# Patient Record
Sex: FEMALE | Race: WHITE | NOT HISPANIC OR LATINO | Employment: FULL TIME | URBAN - METROPOLITAN AREA
[De-identification: names, ages, dates, MRNs, and addresses within clinical notes are randomized per-mention and may not be internally consistent; named-entity substitution may affect disease eponyms.]

---

## 2017-01-13 ENCOUNTER — TELEPHONE (OUTPATIENT)
Dept: OBGYN | Facility: CLINIC | Age: 32
End: 2017-01-13

## 2017-03-06 ENCOUNTER — OFFICE VISIT (OUTPATIENT)
Dept: FAMILY MEDICINE | Facility: CLINIC | Age: 32
End: 2017-03-06
Payer: COMMERCIAL

## 2017-03-06 VITALS
WEIGHT: 123 LBS | HEART RATE: 93 BPM | HEIGHT: 65 IN | DIASTOLIC BLOOD PRESSURE: 70 MMHG | TEMPERATURE: 99 F | OXYGEN SATURATION: 100 % | BODY MASS INDEX: 20.49 KG/M2 | SYSTOLIC BLOOD PRESSURE: 114 MMHG

## 2017-03-06 DIAGNOSIS — F41.9 ANXIETY: ICD-10-CM

## 2017-03-06 DIAGNOSIS — Z32.01 PREGNANCY TEST POSITIVE: Primary | ICD-10-CM

## 2017-03-06 PROBLEM — F12.10 MARIJUANA ABUSE: Status: ACTIVE | Noted: 2017-03-06

## 2017-03-06 LAB — BETA HCG QUAL IFA URINE: POSITIVE

## 2017-03-06 PROCEDURE — 84703 CHORIONIC GONADOTROPIN ASSAY: CPT | Performed by: NURSE PRACTITIONER

## 2017-03-06 PROCEDURE — 99213 OFFICE O/P EST LOW 20 MIN: CPT | Performed by: NURSE PRACTITIONER

## 2017-03-06 RX ORDER — PRENATAL VIT/IRON FUM/FOLIC AC 27MG-0.8MG
1 TABLET ORAL DAILY
Qty: 100 TABLET | Refills: 3 | COMMUNITY
Start: 2017-03-06 | End: 2021-09-21

## 2017-03-06 NOTE — NURSING NOTE
"Chief Complaint   Patient presents with     Confirmation Of Pregnancy       Initial /70 (BP Location: Left arm, Patient Position: Chair, Cuff Size: Adult Regular)  Pulse 93  Temp 99  F (37.2  C)  Ht 5' 5.25\" (1.657 m)  Wt 123 lb (55.8 kg)  LMP 02/03/2017  SpO2 100%  BMI 20.31 kg/m2 Estimated body mass index is 20.31 kg/(m^2) as calculated from the following:    Height as of this encounter: 5' 5.25\" (1.657 m).    Weight as of this encounter: 123 lb (55.8 kg).  Medication Reconciliation: complete     Heidy Alanis MA      "

## 2017-03-06 NOTE — PROGRESS NOTES
"  SUBJECTIVE:                                                    Carole Hernández is a 31 year old female  who presents to clinic today for the following health issues:      Chief Complaint   Patient presents with     Confirmation Of Pregnancy     Took positive pregnancy test last week.  Planned pregnancy. .  Urinating frequently, nauseated, breast tenderness, fatigued. No uterine cramping or vaginal bleeding.    History of anxiety. Has been using occasional Klonopin and smoking marijuana to control her symptoms. Tried Celexa and felt too distant from .        Problem list and histories reviewed & adjusted, as indicated.  Additional history: as documented    Patient Active Problem List   Diagnosis     Anxiety     Grief reaction     CARDIOVASCULAR SCREENING; LDL GOAL LESS THAN 160     Marijuana abuse     Past Surgical History   Procedure Laterality Date     No history of surgery         Social History   Substance Use Topics     Smoking status: Former Smoker     Packs/day: 0.50     Types: Cigarettes     Smokeless tobacco: Never Used      Comment: Also using E-cig     Alcohol use 0.0 oz/week     0 Standard drinks or equivalent per week      Comment: twice per month     Family History   Problem Relation Age of Onset     Adopted: Yes     Unknown/Adopted Mother      Unknown/Adopted Father            Reviewed and updated as needed this visit by clinical staff  Tobacco  Allergies  Meds  Med Hx  Surg Hx  Fam Hx  Soc Hx      Reviewed and updated as needed this visit by Provider         ROS:  Constitutional, breast, gu, psych systems are negative, except as otherwise noted.    OBJECTIVE:                                                    /70 (BP Location: Left arm, Patient Position: Chair, Cuff Size: Adult Regular)  Pulse 93  Temp 99  F (37.2  C)  Ht 5' 5.25\" (1.657 m)  Wt 123 lb (55.8 kg)  LMP 2017  SpO2 100%  BMI 20.31 kg/m2  Body mass index is 20.31 kg/(m^2).  GENERAL: healthy, alert and " no distress  PSYCH: mentation appears normal, affect normal/bright    Diagnostic Test Results:  Results for orders placed or performed in visit on 03/06/17 (from the past 24 hour(s))   Beta HCG qual IFA urine   Result Value Ref Range    Beta HCG Qual IFA Urine Positive (A) NEG        ASSESSMENT/PLAN:                                                        1. Pregnancy test positive  Reviewed healthy pregnancy lifestyle: No smoking or alcohol, start daily prenatal vitamin, avoid raw/undercooked meats and soft cheeses, limit fish to 2 servings per week and avoid tuna or seabass which are high in mercury, limit caffeine to 2 cups of coffee per day, don't change litterbox.   Reviewed warning signs of miscarriage including cramping and vaginal bleeding.     - Beta HCG qual IFA urine    2. Anxiety  I have asked her to stop the Klonopin and marijuana. Discussed starting an SSRI for anxiety but she prefers to try to manage without medications and will follow up if her anxiety worsens.      Follow up with Dr. Marcial at 10 weeks gestation    LEILA Moeller [facility]

## 2017-03-06 NOTE — MR AVS SNAPSHOT
After Visit Summary   3/6/2017    Carole Hernández    MRN: [id number]           Patient Information     Date Of Birth          1985        Visit Information        Provider Department      3/6/2017 8:40 AM Mima Kapadia APRN [facility]        Today's Diagnoses     Pregnancy test positive    -  1      Care Instructions    Due date is November 9, 2017  You are 4 weeks and 3 days    Schedule your first appiontment with Dr. Marcial at around 10 weeks gestation    [facility]    If you have any questions regarding to your visit please contact your care team:       Team Red:   Clinic Hours Telephone Number   Dr. Bethany Kapadia, NP   7am-7pm  Monday - Thursday   7am-5pm  Fridays  [phone]  (Appointment scheduling available 24/7)    Questions about your visit?   Team Line  [phone]   [facility] [place] [facility] 11am-9pm Monday-Friday Saturday-Sunday- 9am-5pm   [place] - 5pm-9pm Monday-Friday Saturday-Sunday- 9am-5pm  [phone] - [facility]  [phone] - [place]       What options do I have for visits at the clinic other than the traditional office visit?  To expand how we care for you, many of our providers are utilizing electronic visits (e-visits) and telephone visits, when medically appropriate, for interactions with their patients rather than a visit in the clinic.   We also offer nurse visits for many medical concerns. Just like any other service, we will bill your insurance company for this type of visit based on time spent on the phone with your provider. Not all insurance companies cover these visits. Please check with your medical insurance if this type of visit is covered. You will be responsible for any charges that are not paid by your insurance.      E-visits via [employer]:  generally incur a $35.00 fee.  Telephone visits:  Time spent on the phone: *charged  based on time that is spent on the phone in increments of 10 minutes. Estimated cost:   5-10 mins $30.00   11-20 mins. $59.00   21-30 mins. $85.00     Use Daylifehart (secure email communication and access to your chart) to send your primary care provider a message or make an appointment. Ask someone on your Team how to sign up for Daylifehart.  For a Price Quote for your services, please call our [employer] Line at [phone].      As always, Thank you for trusting us with your health care needs!    Discharged by Heidy Alanis MA.          Follow-ups after your visit        Who to contact     If you have questions or need follow up information about today's clinic visit or your schedule please contact [facility] directly at [phone].  Normal or non-critical lab and imaging results will be communicated to you by MyChart, letter or phone within 4 business days after the clinic has received the results. If you do not hear from us within 7 days, please contact the clinic through Daylifehart or phone. If you have a critical or abnormal lab result, we will notify you by phone as soon as possible.  Submit refill requests through [employer] or call your pharmacy and they will forward the refill request to us. Please allow 3 business days for your refill to be completed.          Additional Information About Your Visit        Daylifehart Information     ShareTrack[employer] gives you secure access to your electronic health record. If you see a primary care provider, you can also send messages to your care team and make appointments. If you have questions, please call your primary care clinic.  If you do not have a primary care provider, please call [phone] and they will assist you.        Care EveryWhere ID     This is your Care EveryWhere ID. This could be used by other organizations to access your [facility] medical records  [id number]        Your Vitals Were     Pulse Temperature Height Last Period Pulse Oximetry BMI (Body  "Mass Index)    93 99  F (37.2  C) 5' 5.25\" (1.657 m) 02/03/2017 100% 20.31 kg/m2       Blood Pressure from Last 3 Encounters:   03/06/17 114/70   09/20/16 110/63   04/11/16 119/76    Weight from Last 3 Encounters:   03/06/17 123 lb (55.8 kg)   09/20/16 115 lb 12.8 oz (52.5 kg)   04/11/16 116 lb (52.6 kg)              We Performed the Following     Beta HCG qual IFA urine        Primary Care Provider Office Phone # Fax #    Mima Ellie Kapadia, LEILA [facility] [phone]       [facility] [address] [facility] [address]        Thank you!     Thank you for choosing [facility]  for your care. Our goal is always to provide you with excellent care. Hearing back from our patients is one way we can continue to improve our services. Please take a few minutes to complete the written survey that you may receive in the mail after your visit with us. Thank you!             Your Updated Medication List - Protect others around you: Learn how to safely use, store and throw away your medicines at www.disposemymeds.org.          This list is accurate as of: 3/6/17  9:36 AM.  Always use your most recent med list.                   Brand Name Dispense Instructions for use    prenatal multivitamin  plus iron 27-0.8 MG Tabs per tablet     100 tablet    Take 1 tablet by mouth daily         "

## 2017-03-06 NOTE — PATIENT INSTRUCTIONS
Due date is November 9, 2017  You are 4 weeks and 3 days    Schedule your first appiontment with Dr. Marcial at around 10 weeks gestation    [facility]    If you have any questions regarding to your visit please contact your care team:       Team Red:   Clinic Hours Telephone Number   Dr. Bethany Kapadia, NP   7am-7pm  Monday - Thursday   7am-5pm  Fridays  [phone]  (Appointment scheduling available 24/7)    Questions about your visit?   Team Line  [phone]   Urgent Care - [place] and [place] - 11am-9pm Monday-Friday Saturday-Sunday- 9am-5pm   [place] - 5pm-9pm Monday-Friday Saturday-Sunday- 9am-5pm  [phone] - [facility]  [phone] - [place]       What options do I have for visits at the clinic other than the traditional office visit?  To expand how we care for you, many of our providers are utilizing electronic visits (e-visits) and telephone visits, when medically appropriate, for interactions with their patients rather than a visit in the clinic.   We also offer nurse visits for many medical concerns. Just like any other service, we will bill your insurance company for this type of visit based on time spent on the phone with your provider. Not all insurance companies cover these visits. Please check with your medical insurance if this type of visit is covered. You will be responsible for any charges that are not paid by your insurance.      E-visits via [employer]imi:  generally incur a $35.00 fee.  Telephone visits:  Time spent on the phone: *charged based on time that is spent on the phone in increments of 10 minutes. Estimated cost:   5-10 mins $30.00   11-20 mins. $59.00   21-30 mins. $85.00     Use [employer] (secure email communication and access to your chart) to send your primary care provider a message or make an appointment. Ask someone on your Team how to sign up for [employer] Mimi.  For a Price Quote for your  services, please call our Consumer Price Line at [phone].      As always, Thank you for trusting us with your health care needs!    Discharged by Heidy Alanis MA.

## 2017-04-10 ENCOUNTER — PRENATAL OFFICE VISIT (OUTPATIENT)
Dept: OBGYN | Facility: CLINIC | Age: 32
End: 2017-04-10
Payer: COMMERCIAL

## 2017-04-10 VITALS
BODY MASS INDEX: 20.56 KG/M2 | DIASTOLIC BLOOD PRESSURE: 66 MMHG | HEIGHT: 65 IN | HEART RATE: 101 BPM | SYSTOLIC BLOOD PRESSURE: 113 MMHG | WEIGHT: 123.4 LBS | OXYGEN SATURATION: 100 %

## 2017-04-10 DIAGNOSIS — O09.291 PREGNANCY WITH HISTORY OF MISCARRIAGE, FIRST TRIMESTER: Primary | ICD-10-CM

## 2017-04-10 PROBLEM — O09.299 PREGNANCY WITH HISTORY OF MISCARRIAGE: Status: ACTIVE | Noted: 2017-04-10

## 2017-04-10 LAB
BASOPHILS # BLD AUTO: 0 10E9/L (ref 0–0.2)
BASOPHILS NFR BLD AUTO: 0.4 %
DIFFERENTIAL METHOD BLD: ABNORMAL
EOSINOPHIL # BLD AUTO: 0.1 10E9/L (ref 0–0.7)
EOSINOPHIL NFR BLD AUTO: 2.1 %
ERYTHROCYTE [DISTWIDTH] IN BLOOD BY AUTOMATED COUNT: 12.8 % (ref 10–15)
HCT VFR BLD AUTO: 33.7 % (ref 35–47)
HGB BLD-MCNC: 11.5 G/DL (ref 11.7–15.7)
LYMPHOCYTES # BLD AUTO: 1.2 10E9/L (ref 0.8–5.3)
LYMPHOCYTES NFR BLD AUTO: 20.6 %
MCH RBC QN AUTO: 31.5 PG (ref 26.5–33)
MCHC RBC AUTO-ENTMCNC: 34.1 G/DL (ref 31.5–36.5)
MCV RBC AUTO: 92 FL (ref 78–100)
MONOCYTES # BLD AUTO: 0.3 10E9/L (ref 0–1.3)
MONOCYTES NFR BLD AUTO: 5.9 %
NEUTROPHILS # BLD AUTO: 4 10E9/L (ref 1.6–8.3)
NEUTROPHILS NFR BLD AUTO: 71 %
PLATELET # BLD AUTO: 241 10E9/L (ref 150–450)
RBC # BLD AUTO: 3.65 10E12/L (ref 3.8–5.2)
WBC # BLD AUTO: 5.6 10E9/L (ref 4–11)

## 2017-04-10 PROCEDURE — 86900 BLOOD TYPING SEROLOGIC ABO: CPT | Performed by: OBSTETRICS & GYNECOLOGY

## 2017-04-10 PROCEDURE — 87340 HEPATITIS B SURFACE AG IA: CPT | Performed by: OBSTETRICS & GYNECOLOGY

## 2017-04-10 PROCEDURE — 86850 RBC ANTIBODY SCREEN: CPT | Performed by: OBSTETRICS & GYNECOLOGY

## 2017-04-10 PROCEDURE — 87086 URINE CULTURE/COLONY COUNT: CPT | Performed by: OBSTETRICS & GYNECOLOGY

## 2017-04-10 PROCEDURE — 36415 COLL VENOUS BLD VENIPUNCTURE: CPT | Performed by: OBSTETRICS & GYNECOLOGY

## 2017-04-10 PROCEDURE — 85025 COMPLETE CBC W/AUTO DIFF WBC: CPT | Performed by: OBSTETRICS & GYNECOLOGY

## 2017-04-10 PROCEDURE — 87389 HIV-1 AG W/HIV-1&-2 AB AG IA: CPT | Performed by: OBSTETRICS & GYNECOLOGY

## 2017-04-10 PROCEDURE — 86901 BLOOD TYPING SEROLOGIC RH(D): CPT | Performed by: OBSTETRICS & GYNECOLOGY

## 2017-04-10 PROCEDURE — 86762 RUBELLA ANTIBODY: CPT | Performed by: OBSTETRICS & GYNECOLOGY

## 2017-04-10 PROCEDURE — 99207 ZZC FIRST OB VISIT: CPT | Performed by: OBSTETRICS & GYNECOLOGY

## 2017-04-10 PROCEDURE — 86780 TREPONEMA PALLIDUM: CPT | Performed by: OBSTETRICS & GYNECOLOGY

## 2017-04-10 NOTE — PROGRESS NOTES
INITIAL OB ASSESSMENT............................................Date: 4/10/2017                            ---------------------    Name: Carole Hernández.......................................................................Plan Number: [id number]    Age: 31 year old   : 1985  Phone: [phone] (home)   Address: [address] [place] [address]    Marital Status:      Occupation:   for [employer] in [address]   Partner's Name:  Andrew Hernández  Partner's Occupation:  Construction, [employer]    OB Physician: Wicho Marcial       LMP:  Patient's LMP from OB Dating Form:  Patient's last menstrual period was 2017.  Regular menses? yes  Menses every month.   Length of menses: 5 days    Obstetrical History  ===================  Obstetric History       T0      TAB0   SAB0   E0   M0   L0       # Outcome Date GA Lbr [place]/2nd Weight Sex Delivery Anes PTL Lv   1 Current                   Past Medical History:  Past Medical History:   Diagnosis Date     BV (bacterial vaginosis)        Past Surgical History:  Past Surgical History:   Procedure Laterality Date     NO HISTORY OF SURGERY         Current Outpatient Prescriptions   Medication Sig Dispense Refill     Prenatal Vit-Fe Fumarate-FA (PRENATAL MULTIVITAMIN  PLUS IRON) 27-0.8 MG TABS per tablet Take 1 tablet by mouth daily 100 tablet 3       No Known Allergies    Social History     Social History     Marital status: Significant other     Spouse name: N/A     Number of children: 0     Years of education: N/A     Occupational History      [employer]     Social History Main Topics     Smoking status: Former Smoker     Packs/day: 0.50     Types: Cigarettes     Smokeless tobacco: Never Used      Comment: Also using E-cig     Alcohol use 0.0 oz/week     0 Standard drinks or equivalent per week      Comment: twice per month     Drug use: No     Sexual activity: Yes     Partners: Male      "Birth control/ protection:      Other Topics Concern     Not on file     Social History Narrative       No substance abuse, environmental exposures, mental health risks and No financial concerns.    The couple has 2 dogs, pit bulls.    Good Partner support.       Family History   Problem Relation Age of Onset     Adopted: Yes     Unknown/Adopted Mother      Unknown/Adopted Father        Past Medical History of Father of Baby:   No significant medical history     She is adopted, so she does not have information regarding family genetics or diseases.   No known genetic diseases in the FOB's 1st or 2nd degree relatives      REVIEW OF SYMPTOMS:   History Since Last Menstrual Period:    nausea, vomiting, fatigue and breast tenderness       PHYSICAL EXAM:  /66 (BP Location: Right arm, Cuff Size: Adult Regular)  Pulse 101  Ht 5' 5.25\" (1.657 m)  Wt 123 lb 6.4 oz (56 kg)  LMP 2017  SpO2 100%  BMI 20.38 kg/m2  General:  WNWD female, NAD  Oriented:  X 3  Alert  PSYCH:  mentation appears normal., affect and mood normal  HEENT:  NC/AT, EOMI  NECK:  Neck supple. No adenopathy. Thyroid symmetric, normal size,, Carotids without bruits.  HEART:  RRR  LUNGS:  Clear to auscultation.  Good respiratory effort  BREASTS: not performed since recent exam   ABDOMEN: Benign, Soft, flat, non-tender, No masses, organomegaly and Bowel sounds normoactive FHM seen on bedside ultrasound.    PELVIC EXAM:  Deferred since recent exam  EXTREMITIES:No cyanosis, clubbing, warm and well perfused and No edema   GAIT: normal including tandem walk, heel and toe walk.        Assessment:   IUP at 9.3 weeks     Plan:  Options for  testing for chromosomal and birth defects were discussed with the patient.  Diagnostic tests include CVS and Amniocentesis.  We discussed that these tests are definitive but invasive and do carry a risk of fetal loss.    Screening tests include nuchal translucency/blood marker testing in the first " trimester and quad screening in the second trimester.  We discussed that these are screening tests and not diagnostic tests and that false positives and negatives are a distinct possibility.  The patient and her  will discuss and decide.  We discussed physician coverage, tertiary support, diet, exercise, weight gain, schedule of visits, routine and indicated ultrasounds, and childbirth education.   Labs done today  RTC 4 weeks

## 2017-04-10 NOTE — PATIENT INSTRUCTIONS
If you have any questions regarding your visit, Please contact your care team.   Women s Health  CLINIC HOURS  TELEPHONE NUMBER    SOREN Solomon-   Jaz Pablo-KULDEEP Long-Medical Assistant  Tuesday-[place]  7:30 a.m-3:30 p.m   Wednesday-[place]  8:00 a.m-4:30 p.m.   Thursday-[place] 8:00 a.m-4:30 p.m.   Friday-[place]  7:30a.m-1:00 p.m.  [facility]   [address] [place] [address].   [place] [address]   [phone] ask for [facility]   Imaging [phone]     [facility]   [address] [place] [address]   [phone]     [facility]  [address] [place] [address]   [phone] ask for [facility]   Imaging [phone]      Urgent Care locations:   [facility]  Monday-Friday   5 pm - 9 pm   Saturday and Sunday   9 am - 5 pm   Monday-Friday   11 am - 9 pm   Saturday and Sunday   9 am - 5 pm  [phone]    If you need a medication refill, please contact your pharmacy. Please allow 3 business days for your refill to be completed.  As always, Thank you for trusting us with your healthcare needs!

## 2017-04-10 NOTE — NURSING NOTE
"Chief Complaint   Patient presents with     Prenatal Care     First OB       Initial /66 (BP Location: Right arm, Cuff Size: Adult Regular)  Pulse 101  Ht 5' 5.25\" (1.657 m)  Wt 123 lb 6.4 oz (56 kg)  LMP 02/03/2017  SpO2 100%  BMI 20.38 kg/m2 Estimated body mass index is 20.38 kg/(m^2) as calculated from the following:    Height as of this encounter: 5' 5.25\" (1.657 m).    Weight as of this encounter: 123 lb 6.4 oz (56 kg).  Medication Reconciliation: complete   MIHAI Le 4/10/2017         "

## 2017-04-10 NOTE — MR AVS SNAPSHOT
After Visit Summary   4/10/2017    Carole Hernández    MRN: [id number]           Patient Information     Date Of Birth          1985        Visit Information        Provider Department      4/10/2017 11:00 AM Wicho Marcial MD [facility]        Today's Diagnoses     Pregnancy with history of miscarriage, first trimester    -  1      Care Instructions    If you have any questions regarding your visit, Please contact your care team.   Women s Health  CLINIC HOURS  TELEPHONE NUMBER    SOREN Solomon-   Jaz Pablo-KULDEEP Long-Medical Assistant  Tuesday-[place]  7:30 a.m-3:30 p.m   Wednesday-[place]  8:00 a.m-4:30 p.m.   Thursday-[place] 8:00 a.m-4:30 p.m.   Friday-[place]  7:30a.m-1:00 p.m.  [facility]   [address] [place] [address]   [phone] ask for [facility]   Imaging [phone]     [facility]   [address] [place] [address] [place] [address]   [phone]     [facility]  [address] [place] [address]    [place] [address]   [phone] ask for [facility]   Imaging [phone]      Urgent Care locations:   [facility]  Monday-Friday   5 pm - 9 pm   Saturday and Sunday   9 am - 5 pm   Monday-Friday   11 am - 9 pm   Saturday and Sunday   9 am - 5 pm  [phone]    If you need a medication refill, please contact your pharmacy. Please allow 3 business days for your refill to be completed.  As always, Thank you for trusting us with your healthcare needs!            Follow-ups after your visit        Your next 10 appointments already scheduled     May 10, 2017 10:00 AM CDT   ESTABLISHED PRENATAL with Wicho Marcial MD   [facility] ([address] [address] [phone]              Who to contact     If you have questions or need follow up  "information about today's clinic visit or your schedule please contact [facility] directly at [phone].  Normal or non-critical lab and imaging results will be communicated to you by MyChart, letter or phone within 4 business days after the clinic has received the results. If you do not hear from us within 7 days, please contact the clinic through Enefgyhart or phone. If you have a critical or abnormal lab result, we will notify you by phone as soon as possible.  Submit refill requests through [employer] or call your pharmacy and they will forward the refill request to us. Please allow 3 business days for your refill to be completed.          Additional Information About Your Visit        Enefgyhar[employer]aHEALTH Information     [employer] gives you secure access to your electronic health record. If you see a primary care provider, you can also send messages to your care team and make appointments. If you have questions, please call your primary care clinic.  If you do not have a primary care provider, please call [phone] and they will assist you.        Care EveryWhere ID     This is your Care EveryWhere ID. This could be used by other organizations to access your [facility] medical records  [id number]        Your Vitals Were     Pulse Height Last Period Pulse Oximetry BMI (Body Mass Index)       101 5' 5.25\" (1.657 m) 02/03/2017 100% 20.38 kg/m2        Blood Pressure from Last 3 Encounters:   04/10/17 113/66   03/06/17 114/70   09/20/16 110/63    Weight from Last 3 Encounters:   04/10/17 123 lb 6.4 oz (56 kg)   03/06/17 123 lb (55.8 kg)   09/20/16 115 lb 12.8 oz (52.5 kg)              We Performed the Following     ABO/Rh type and screen     Anti Treponema     CBC with platelets differential     Hepatitis B surface antigen     HIV Antigen Antibody Combo     Rubella Antibody IgG Quantitative     Urine Culture Aerobic Bacterial        Primary Care Provider Office Phone # Fax #    LEILA Moeller CNP " [phone]       [facility] [address] [facility]  [place] [address]        Thank you!     Thank you for choosing [facility]  for your care. Our goal is always to provide you with excellent care. Hearing back from our patients is one way we can continue to improve our services. Please take a few minutes to complete the written survey that you may receive in the mail after your visit with us. Thank you!             Your Updated Medication List - Protect others around you: Learn how to safely use, store and throw away your medicines at www.disposemymeds.org.          This list is accurate as of: 4/10/17 12:12 PM.  Always use your most recent med list.                   Brand Name Dispense Instructions for use    prenatal multivitamin  plus iron 27-0.8 MG Tabs per tablet     100 tablet    Take 1 tablet by mouth daily

## 2017-04-11 LAB
ABO + RH BLD: NORMAL
ABO + RH BLD: NORMAL
BACTERIA SPEC CULT: NO GROWTH
BLD GP AB SCN SERPL QL: NORMAL
BLOOD BANK CMNT PATIENT-IMP: NORMAL
HBV SURFACE AG SERPL QL IA: NONREACTIVE
HIV 1+2 AB+HIV1 P24 AG SERPL QL IA: NORMAL
MICRO REPORT STATUS: NORMAL
SPECIMEN EXP DATE BLD: NORMAL
SPECIMEN SOURCE: NORMAL
T PALLIDUM IGG+IGM SER QL: NEGATIVE

## 2017-04-13 LAB — RUBV IGG SERPL IA-ACNC: 51 IU/ML

## 2017-05-10 ENCOUNTER — TELEPHONE (OUTPATIENT)
Dept: OBGYN | Facility: CLINIC | Age: 32
End: 2017-05-10

## 2017-05-10 ENCOUNTER — PRENATAL OFFICE VISIT (OUTPATIENT)
Dept: OBGYN | Facility: CLINIC | Age: 32
End: 2017-05-10
Payer: COMMERCIAL

## 2017-05-10 VITALS
WEIGHT: 126.2 LBS | HEART RATE: 98 BPM | SYSTOLIC BLOOD PRESSURE: 113 MMHG | DIASTOLIC BLOOD PRESSURE: 73 MMHG | OXYGEN SATURATION: 98 % | BODY MASS INDEX: 20.84 KG/M2

## 2017-05-10 DIAGNOSIS — O09.292 PREGNANCY WITH HISTORY OF MISCARRIAGE, SECOND TRIMESTER: Primary | ICD-10-CM

## 2017-05-10 PROCEDURE — 99207 ZZC PRENATAL VISIT: CPT | Performed by: OBSTETRICS & GYNECOLOGY

## 2017-05-10 NOTE — LETTER
[facility]  [address] [place] [address]  [phone]      May 10, 2017      Carole MENDOZA Betty  [address]  [facility] [address]          To Whom it may concern,    Carole is under my care for her pregnancy. It is ok for her to have local anesthesia for dental work.          Sincerely,  Wicho Marcial MD

## 2017-05-10 NOTE — TELEPHONE ENCOUNTER
Patient called back. She will pick it up at our [facility] desk tomorrow.  MIHAI Le 5/10/2017

## 2017-05-10 NOTE — NURSING NOTE
"Chief Complaint   Patient presents with     Prenatal Care     13.5 weeks       Initial /73 (BP Location: Right arm, Cuff Size: Adult Regular)  Pulse 98  Wt 126 lb 3.2 oz (57.2 kg)  LMP 02/03/2017  SpO2 98%  BMI 20.84 kg/m2 Estimated body mass index is 20.84 kg/(m^2) as calculated from the following:    Height as of 4/10/17: 5' 5.25\" (1.657 m).    Weight as of this encounter: 126 lb 3.2 oz (57.2 kg).  Medication Reconciliation: complete   MIHAI Le 5/10/2017         "

## 2017-05-10 NOTE — TELEPHONE ENCOUNTER
Tried reaching patient got voicemail told to call back. Dr. Marcial is ok with doing this letter. I have saved a letter in chart if patient calls back. Does she want to pick it up?  MIHAI Le 5/10/2017

## 2017-05-10 NOTE — TELEPHONE ENCOUNTER
Reason for Call:  Other returning call    Detailed comments: Patient returning the call. Please call her back.     Phone Number Patient can be reached at: Home number on file [phone] (home)    Best Time: any     Can we leave a detailed message on this number? YES    Call taken on 5/10/2017 at 3:18 PM by Cherry Ramirez

## 2017-05-10 NOTE — MR AVS SNAPSHOT
After Visit Summary   5/10/2017    Carole Hernández    MRN: [id number]           Patient Information     Date Of Birth          1985        Visit Information        Provider Department      5/10/2017 10:00 AM Wicho Marcial MD [facility]         Follow-ups after your visit        Your next 10 appointments already scheduled     Jun 07, 2017 10:00 AM CDT   ESTABLISHED PRENATAL with Wicho Marcial MD   [facility] ([address] [id number] [phone]              Who to contact     If you have questions or need follow up information about today's clinic visit or your schedule please contact [facility] directly at [phone].  Normal or non-critical lab and imaging results will be communicated to you by MyChart, letter or phone within 4 business days after the clinic has received the results. If you do not hear from us within 7 days, please contact the clinic through MyChart or phone. If you have a critical or abnormal lab result, we will notify you by phone as soon as possible.  Submit refill requests through [employer] or call your pharmacy and they will forward the refill request to us. Please allow 3 business days for your refill to be completed.          Additional Information About Your Visit        MyChart Information     [employer] gives you secure access to your electronic health record. If you see a primary care provider, you can also send messages to your care team and make appointments. If you have questions, please call your primary care clinic.  If you do not have a primary care provider, please call [phone] and they will assist you.        Care EveryWhere ID     This is your Care EveryWhere ID. This could be used by other organizations to access your [place] medical records  [id number]        Your Vitals Were     Pulse Last Period Pulse Oximetry BMI (Body Mass Index)           98 02/03/2017 98% 20.84 kg/m2         Blood Pressure from Last 3 Encounters:   05/10/17 113/73   04/10/17 113/66   03/06/17 114/70    Weight from Last 3 Encounters:   05/10/17 126 lb 3.2 oz (57.2 kg)   04/10/17 123 lb 6.4 oz (56 kg)   03/06/17 123 lb (55.8 kg)              Today, you had the following     No orders found for display       Primary Care Provider Office Phone # Fax #    Mima Ellie Kapadia, LEILA [facility] [phone]       [facility] [address] [facility] [address]        Thank you!     Thank you for choosing [facility]  for your care. Our goal is always to provide you with excellent care. Hearing back from our patients is one way we can continue to improve our services. Please take a few minutes to complete the written survey that you may receive in the mail after your visit with us. Thank you!             Your Updated Medication List - Protect others around you: Learn how to safely use, store and throw away your medicines at www.disposemymeds.org.          This list is accurate as of: 5/10/17 10:12 AM.  Always use your most recent med list.                   Brand Name Dispense Instructions for use    prenatal multivitamin  plus iron 27-0.8 MG Tabs per tablet     100 tablet    Take 1 tablet by mouth daily

## 2017-05-10 NOTE — PROGRESS NOTES
13w5d   Eating well.  Nausea resolved.   Discussed genetic screening.  They would like to do the quad test.   RTC 4 weeks.

## 2017-05-10 NOTE — TELEPHONE ENCOUNTER
Reason for Call:  Other call back    Detailed comments: patient would like to request an letter made out to her dental office stating is it ok for her to receive an local anesthesia,  Please contact the patient to discuss further      Phone Number Patient can be reached at: Home number on file [phone] (home)    Best Time: today    Can we leave a detailed message on this number? YES    Call taken on 5/10/2017 at 1:03 PM by Leo Ross

## 2017-06-07 ENCOUNTER — PRENATAL OFFICE VISIT (OUTPATIENT)
Dept: OBGYN | Facility: CLINIC | Age: 32
End: 2017-06-07
Payer: COMMERCIAL

## 2017-06-07 VITALS
OXYGEN SATURATION: 97 % | DIASTOLIC BLOOD PRESSURE: 63 MMHG | WEIGHT: 128.6 LBS | HEART RATE: 101 BPM | SYSTOLIC BLOOD PRESSURE: 101 MMHG | BODY MASS INDEX: 21.24 KG/M2

## 2017-06-07 DIAGNOSIS — O09.292 PREGNANCY WITH HISTORY OF MISCARRIAGE, SECOND TRIMESTER: Primary | ICD-10-CM

## 2017-06-07 PROCEDURE — 81511 FTL CGEN ABNOR FOUR ANAL: CPT | Mod: 90 | Performed by: OBSTETRICS & GYNECOLOGY

## 2017-06-07 PROCEDURE — 99000 SPECIMEN HANDLING OFFICE-LAB: CPT | Performed by: OBSTETRICS & GYNECOLOGY

## 2017-06-07 PROCEDURE — 99207 ZZC PRENATAL VISIT: CPT | Performed by: OBSTETRICS & GYNECOLOGY

## 2017-06-07 PROCEDURE — 36415 COLL VENOUS BLD VENIPUNCTURE: CPT | Performed by: OBSTETRICS & GYNECOLOGY

## 2017-06-07 NOTE — MR AVS SNAPSHOT
After Visit Summary   6/7/2017    Carole Hernández    MRN: [id number]           Patient Information     Date Of Birth          1985        Visit Information        Provider Department      6/7/2017 10:00 AM Wicho Marcial MD [facility]        Today's Diagnoses     Pregnancy with history of miscarriage, second trimester    -  1       Follow-ups after your visit        Your next 10 appointments already scheduled     Jun 23, 2017  9:00 AM CDT   US OB > 14 WEEKS COMPLETE SINGLE with FKUS1   [facility] ([facility])    [address] [place] [address]   [phone]           Please bring a list of your medicines (including vitamins, minerals and over-the-counter drugs). Also, tell your doctor about any allergies you may have. Wear comfortable clothes and leave your valuables at home.  If you re less than 20 weeks drink four 8-ounce glasses of fluid an hour before your exam. If you need to empty your bladder before your exam, try to release only a little urine. Then, drink another glass of fluid.  You may have up to two family members in the exam room. If you bring a small child, an adult must be there to care for him or her.  Please call the Imaging Department at your exam site with any questions.            Jul 05, 2017 10:15 AM CDT   ESTABLISHED PRENATAL with MD Aryan Sen ([facility])    [address] [place] [id number] [phone]              Future tests that were ordered for you today     Open Future Orders        Priority Expected Expires Ordered    US OB > 14 Weeks Complete Single Routine  6/7/2018 6/7/2017            Who to contact     If you have questions or need follow up information about today's clinic visit or your schedule please contact [facility] directly at [phone].  Normal or non-critical lab and imaging results will be  communicated to you by [employer], letter or phone within 4 business days after the clinic has received the results. If you do not hear from us within 7 days, please contact the clinic through [employer] or phone. If you have a critical or abnormal lab result, we will notify you by phone as soon as possible.  Submit refill requests through [employer] or call your pharmacy and they will forward the refill request to us. Please allow 3 business days for your refill to be completed.          Additional Information About Your Visit        [employer] Information     [employer] gives you secure access to your electronic health record. If you see a primary care provider, you can also send messages to your care team and make appointments. If you have questions, please call your primary care clinic.  If you do not have a primary care provider, please call [phone] and they will assist you.        Care EveryWhere ID     This is your Care EveryWhere ID. This could be used by other organizations to access your [place] medical records  [id number]        Your Vitals Were     Pulse Last Period Pulse Oximetry BMI (Body Mass Index)          101 02/03/2017 97% 21.24 kg/m2         Blood Pressure from Last 3 Encounters:   06/07/17 101/63   05/10/17 113/73   04/10/17 113/66    Weight from Last 3 Encounters:   06/07/17 128 lb 9.6 oz (58.3 kg)   05/10/17 126 lb 3.2 oz (57.2 kg)   04/10/17 123 lb 6.4 oz (56 kg)              We Performed the Following     Maternal quad screen        Primary Care Provider Office Phone # Fax #    Mima LEILA [facility] [phone]       [facility] [address] [facility] [address]        Thank you!     Thank you for choosing [facility]  for your care. Our goal is always to provide you with excellent care. Hearing back from our patients is one way we can continue to improve our services. Please take a few minutes to complete the written survey that you may  receive in the mail after your visit with us. Thank you!             Your Updated Medication List - Protect others around you: Learn how to safely use, store and throw away your medicines at www.disposemymeds.org.          This list is accurate as of: 6/7/17 10:41 AM.  Always use your most recent med list.                   Brand Name Dispense Instructions for use    prenatal multivitamin  plus iron 27-0.8 MG Tabs per tablet     100 tablet    Take 1 tablet by mouth daily

## 2017-06-07 NOTE — PROGRESS NOTES
17w5d   Eating well.   Discussed genetic screening.  They will do the quad test today.   Ultrasound 2-3 weeks.   RTC 4 weeks   Wicho Marcial MD

## 2017-06-07 NOTE — NURSING NOTE
"No chief complaint on file.      Initial /63 (BP Location: Right arm, Cuff Size: Adult Regular)  Pulse 101  Wt 128 lb 9.6 oz (58.3 kg)  LMP 02/03/2017  SpO2 97%  BMI 21.24 kg/m2 Estimated body mass index is 21.24 kg/(m^2) as calculated from the following:    Height as of 4/10/17: 5' 5.25\" (1.657 m).    Weight as of this encounter: 128 lb 9.6 oz (58.3 kg).  Medication Reconciliation: complete   MIHAI Le 6/7/2017         "

## 2017-06-09 LAB
# FETUSES US: NORMAL
AFP ADJ MOM AMN: 1.67
AFP SERPL-MCNC: 77 NG/ML
AGE - REPORTED: 32.2
DATING METHOD: NORMAL
DIABETIC AT CONCEPTION: NO
FAMILY MEMBER DISEASES HX: NO
FAMILY MEMBER DISEASES HX: NO
GA METHOD: NORMAL
GA: 17.71 WK
HCG MOM SERPL: 0.66
HCG SERPL-ACNC: NORMAL M[IU]/ML
HX OF HEREDITARY DISORDERS: NO
IDDM PATIENT QL: NO
INHIBIN A MOM SERPL: 1.49
INHIBIN A SERPL-MCNC: 250
INTEGRATED SCN PATIENT-IMP: NORMAL
LMP START DATE: NORMAL
PATHOLOGY STUDY: NORMAL
PREV HX CHROMOSOME ABNORMALITY: NO
SPECIMEN DRAWN SERPL: NORMAL
TWINS: NO
U ESTRIOL MOM SERPL: 1.03
U ESTRIOL SERPL-MCNC: 1.48 NG/ML

## 2017-06-23 ENCOUNTER — RADIANT APPOINTMENT (OUTPATIENT)
Dept: ULTRASOUND IMAGING | Facility: CLINIC | Age: 32
End: 2017-06-23
Attending: OBSTETRICS & GYNECOLOGY
Payer: COMMERCIAL

## 2017-06-23 DIAGNOSIS — O09.292 PREGNANCY WITH HISTORY OF MISCARRIAGE, SECOND TRIMESTER: ICD-10-CM

## 2017-06-23 PROCEDURE — 76805 OB US >/= 14 WKS SNGL FETUS: CPT

## 2017-07-11 ENCOUNTER — PRENATAL OFFICE VISIT (OUTPATIENT)
Dept: OBGYN | Facility: CLINIC | Age: 32
End: 2017-07-11
Payer: COMMERCIAL

## 2017-07-11 VITALS
HEART RATE: 94 BPM | OXYGEN SATURATION: 99 % | DIASTOLIC BLOOD PRESSURE: 68 MMHG | WEIGHT: 136.4 LBS | BODY MASS INDEX: 22.52 KG/M2 | SYSTOLIC BLOOD PRESSURE: 107 MMHG

## 2017-07-11 DIAGNOSIS — O09.292 PREGNANCY WITH HISTORY OF MISCARRIAGE, SECOND TRIMESTER: Primary | ICD-10-CM

## 2017-07-11 PROCEDURE — 99207 ZZC PRENATAL VISIT: CPT | Performed by: OBSTETRICS & GYNECOLOGY

## 2017-07-11 NOTE — MR AVS SNAPSHOT
After Visit Summary   7/11/2017    Carole Hernández    MRN: [id number]           Patient Information     Date Of Birth          1985        Visit Information        Provider Department      7/11/2017 11:00 AM Wicho Marcial MD [facility]         Follow-ups after your visit        Your next 10 appointments already scheduled     Aug 08, 2017  9:30 AM CDT   ESTABLISHED PRENATAL with Wicho Marcial MD   [facility] ([address] [id number] [phone]              Who to contact     If you have questions or need follow up information about today's clinic visit or your schedule please contact [facility] directly at [phone].  Normal or non-critical lab and imaging results will be communicated to you by MyChart, letter or phone within 4 business days after the clinic has received the results. If you do not hear from us within 7 days, please contact the clinic through MyChart or phone. If you have a critical or abnormal lab result, we will notify you by phone as soon as possible.  Submit refill requests through [employer] or call your pharmacy and they will forward the refill request to us. Please allow 3 business days for your refill to be completed.          Additional Information About Your Visit        MyChart Information     [employer] gives you secure access to your electronic health record. If you see a primary care provider, you can also send messages to your care team and make appointments. If you have questions, please call your primary care clinic.  If you do not have a primary care provider, please call [phone] and they will assist you.        Care EveryWhere ID     This is your Care EveryWhere ID. This could be used by other organizations to access your [place] medical records  [id number]        Your Vitals Were     Pulse Last Period Pulse Oximetry BMI (Body Mass Index)           94 02/03/2017 99% 22.52 kg/m2         Blood Pressure from Last 3 Encounters:   07/11/17 107/68   06/07/17 101/63   05/10/17 113/73    Weight from Last 3 Encounters:   07/11/17 136 lb 6.4 oz (61.9 kg)   06/07/17 128 lb 9.6 oz (58.3 kg)   05/10/17 126 lb 3.2 oz (57.2 kg)              Today, you had the following     No orders found for display       Primary Care Provider Office Phone # Fax #    Mima Elliebhavesh Kapadia, APRN [phone]       [address] [address]        Equal Access to Services     LUCIAN COLIN : Hadii aad ku hadasho Soomaali, waaxda luqadaha, qaybta kaalmada adeegyada, waxay idiin hayashtyn maldonado . So [facility] [phone].    ATENCIÓN: Si habla español, tiene a kearney disposición servicios gratuitos de asistencia lingüística. Llame al [phone].    We comply with applicable federal civil rights laws and [place] laws. We do not discriminate on the basis of race, color, national origin, age, disability sex, sexual orientation or gender identity.            Thank you!     Thank you for choosing [facility]  for your care. Our goal is always to provide you with excellent care. Hearing back from our patients is one way we can continue to improve our services. Please take a few minutes to complete the written survey that you may receive in the mail after your visit with us. Thank you!             Your Updated Medication List - Protect others around you: Learn how to safely use, store and throw away your medicines at www.disposemymeds.org.          This list is accurate as of: 7/11/17 11:23 AM.  Always use your most recent med list.                   Brand Name Dispense Instructions for use Diagnosis    prenatal multivitamin  plus iron 27-0.8 MG Tabs per tablet     100 tablet    Take 1 tablet by mouth daily

## 2017-07-11 NOTE — NURSING NOTE
"Chief Complaint   Patient presents with     Prenatal Care     22.4 weeks       Initial /68 (BP Location: Right arm, Cuff Size: Adult Regular)  Pulse 94  Wt 136 lb 6.4 oz (61.9 kg)  LMP 02/03/2017  SpO2 99%  BMI 22.52 kg/m2 Estimated body mass index is 22.52 kg/(m^2) as calculated from the following:    Height as of 4/10/17: 5' 5.25\" (1.657 m).    Weight as of this encounter: 136 lb 6.4 oz (61.9 kg).  Medication Reconciliation: complete   MIHAI Le 7/11/2017         "

## 2017-07-11 NOTE — PROGRESS NOTES
22w4d.   Doing well without issues/concerns.    Maternal quad results show low risk for anomalies screened.   U/S results reviewed.   Labs at next visit  RTC 4 weeks  Wicho Marcial MD

## 2017-08-08 ENCOUNTER — PRENATAL OFFICE VISIT (OUTPATIENT)
Dept: OBGYN | Facility: CLINIC | Age: 32
End: 2017-08-08
Payer: COMMERCIAL

## 2017-08-08 VITALS
DIASTOLIC BLOOD PRESSURE: 74 MMHG | BODY MASS INDEX: 23.94 KG/M2 | WEIGHT: 145 LBS | SYSTOLIC BLOOD PRESSURE: 112 MMHG | HEART RATE: 90 BPM | OXYGEN SATURATION: 98 % | TEMPERATURE: 98.4 F

## 2017-08-08 DIAGNOSIS — O09.292 PREGNANCY WITH HISTORY OF MISCARRIAGE, SECOND TRIMESTER: Primary | ICD-10-CM

## 2017-08-08 LAB
GLUCOSE 1H P 50 G GLC PO SERPL-MCNC: 133 MG/DL (ref 60–129)
HGB BLD-MCNC: 10.8 G/DL (ref 11.7–15.7)

## 2017-08-08 PROCEDURE — 36415 COLL VENOUS BLD VENIPUNCTURE: CPT | Performed by: OBSTETRICS & GYNECOLOGY

## 2017-08-08 PROCEDURE — 82950 GLUCOSE TEST: CPT | Performed by: OBSTETRICS & GYNECOLOGY

## 2017-08-08 PROCEDURE — 00000218 ZZHCL STATISTIC OBHBG - HEMOGLOBIN: Performed by: OBSTETRICS & GYNECOLOGY

## 2017-08-08 PROCEDURE — 86850 RBC ANTIBODY SCREEN: CPT | Performed by: OBSTETRICS & GYNECOLOGY

## 2017-08-08 PROCEDURE — 99207 ZZC PRENATAL VISIT: CPT | Performed by: OBSTETRICS & GYNECOLOGY

## 2017-08-08 NOTE — NURSING NOTE
"Chief Complaint   Patient presents with     Prenatal Care       Initial /74  Pulse 90  Temp 98.4  F (36.9  C) (Oral)  Wt 145 lb (65.8 kg)  LMP 02/03/2017  SpO2 98%  BMI 23.94 kg/m2 Estimated body mass index is 23.94 kg/(m^2) as calculated from the following:    Height as of 4/10/17: 5' 5.25\" (1.657 m).    Weight as of this encounter: 145 lb (65.8 kg).  Medication Reconciliation: complete  Katrina Palomino M.A.    "

## 2017-08-08 NOTE — MR AVS SNAPSHOT
After Visit Summary   8/8/2017    Carole Hernández    MRN: [id number]           Patient Information     Date Of Birth          1985        Visit Information        Provider Department      8/8/2017 9:30 AM Wicho Marcial MD [facility]        Today's Diagnoses     Pregnancy with history of miscarriage, second trimester    -  1       Follow-ups after your visit        Follow-up notes from your care team     Return in about 2 weeks (around 8/22/2017) for prenatal visit.      Your next 10 appointments already scheduled     Aug 23, 2017  9:30 AM CDT   ESTABLISHED PRENATAL with Wicho Marcial MD   [facility] ([facility])    [address] [place] [address] [phone]              Who to contact     If you have questions or need follow up information about today's clinic visit or your schedule please contact [facility] directly at [phone].  Normal or non-critical lab and imaging results will be communicated to you by [employer], letter or phone within 4 business days after the clinic has received the results. If you do not hear from us within 7 days, please contact the clinic through [employer] or phone. If you have a critical or abnormal lab result, we will notify you by phone as soon as possible.  Submit refill requests through [employer] or call your pharmacy and they will forward the refill request to us. Please allow 3 business days for your refill to be completed.          Additional Information About Your Visit        MyChart Information     [employer] gives you secure access to your electronic health record. If you see a primary care provider, you can also send messages to your care team and make appointments. If you have questions, please call your primary care clinic.  If you do not have a primary care provider, please call [phone] and they will assist you.        Care EveryWhere ID     This is your Care  EveryWhere ID. This could be used by other organizations to access your [place] medical records  [id number]        Your Vitals Were     Pulse Temperature Last Period Pulse Oximetry BMI (Body Mass Index)       90 98.4  F (36.9  C) (Oral) 02/03/2017 98% 23.94 kg/m2        Blood Pressure from Last 3 Encounters:   08/08/17 112/74   07/11/17 107/68   06/07/17 101/63    Weight from Last 3 Encounters:   08/08/17 145 lb (65.8 kg)   07/11/17 136 lb 6.4 oz (61.9 kg)   06/07/17 128 lb 9.6 oz (58.3 kg)              We Performed the Following     Antibody screen red cell     Glucose tolerance gest screen 1 hour     OB hemoglobin        Primary Care Provider Office Phone # Fax #    Mima Ellie Kapadia, APRN [facility] [phone]       [address] [address]        Equal Access to Services     LUCIAN COLIN AH: Hadii aad ku hadasho Soomaali, waaxda luqadaha, qaybta kaalmada adeegyada, waxay idiin hayclaudyn ab kharakorey maldonado . So [facility] [phone].    ATENCIÓN: Si habla español, tiene a kearney disposición servicios gratuitos de asistencia lingüística. Llame al [phone].    We comply with applicable federal civil rights laws and [place] laws. We do not discriminate on the basis of race, color, national origin, age, disability sex, sexual orientation or gender identity.            Thank you!     Thank you for choosing [facility]  for your care. Our goal is always to provide you with excellent care. Hearing back from our patients is one way we can continue to improve our services. Please take a few minutes to complete the written survey that you may receive in the mail after your visit with us. Thank you!             Your Updated Medication List - Protect others around you: Learn how to safely use, store and throw away your medicines at www.disposemymeds.org.          This list is accurate as of: 8/8/17  1:04 PM.  Always use your most recent med list.                    Brand Name Dispense Instructions for use Diagnosis    prenatal multivitamin plus iron 27-0.8 MG Tabs per tablet     100 tablet    Take 1 tablet by mouth daily

## 2017-08-09 LAB
BLD GP AB SCN SERPL QL: NORMAL
BLOOD BANK CMNT PATIENT-IMP: NORMAL

## 2017-08-10 ENCOUNTER — TELEPHONE (OUTPATIENT)
Dept: OBGYN | Facility: CLINIC | Age: 32
End: 2017-08-10

## 2017-08-10 NOTE — TELEPHONE ENCOUNTER
Appears labs were released into [facility] prior to Dr. Marcial's review.    Will route to Dr. Marcial for review & orders prior to return call. Bev Ha RN, BAN

## 2017-08-10 NOTE — TELEPHONE ENCOUNTER
Reason for Call:  Other call back    Detailed comments:  Patient calling. She would like to discuss the gtt results further. Please call her and let know.     Phone Number Patient can be reached at: Home number on file [phone] (home)    Best Time:  Any     Can we leave a detailed message on this number? YES    Call taken on 8/10/2017 at 12:14 PM by Cherry Ramirez

## 2017-08-11 DIAGNOSIS — R73.09 ABNORMAL GLUCOSE TOLERANCE TEST: Primary | ICD-10-CM

## 2017-08-11 PROBLEM — O99.013 ANTEPARTUM ANEMIA IN THIRD TRIMESTER: Status: ACTIVE | Noted: 2017-08-11

## 2017-08-11 NOTE — TELEPHONE ENCOUNTER
I sent the lab results and recommendations through the result note.    She is to have the 3 hour OGT and to start Fe for the anemia in pregnancy

## 2017-08-23 ENCOUNTER — PRENATAL OFFICE VISIT (OUTPATIENT)
Dept: OBGYN | Facility: CLINIC | Age: 32
End: 2017-08-23
Payer: COMMERCIAL

## 2017-08-23 VITALS
WEIGHT: 144.6 LBS | DIASTOLIC BLOOD PRESSURE: 73 MMHG | OXYGEN SATURATION: 97 % | HEART RATE: 91 BPM | BODY MASS INDEX: 23.88 KG/M2 | SYSTOLIC BLOOD PRESSURE: 112 MMHG

## 2017-08-23 DIAGNOSIS — R73.09 ABNORMAL GLUCOSE TOLERANCE TEST: ICD-10-CM

## 2017-08-23 DIAGNOSIS — O09.293 PREGNANCY WITH HISTORY OF MISCARRIAGE, THIRD TRIMESTER: Primary | ICD-10-CM

## 2017-08-23 DIAGNOSIS — O99.013 ANTEPARTUM ANEMIA IN THIRD TRIMESTER: ICD-10-CM

## 2017-08-23 LAB
GLUCOSE 1H P 100 G GLC PO SERPL-MCNC: 135 MG/DL (ref 60–179)
GLUCOSE 2H P 100 G GLC PO SERPL-MCNC: 120 MG/DL (ref 60–154)
GLUCOSE 3H P 100 G GLC PO SERPL-MCNC: 96 MG/DL (ref 60–139)
GLUCOSE P FAST SERPL-MCNC: 81 MG/DL (ref 60–94)

## 2017-08-23 PROCEDURE — 82952 GTT-ADDED SAMPLES: CPT | Performed by: OBSTETRICS & GYNECOLOGY

## 2017-08-23 PROCEDURE — 99207 ZZC PRENATAL VISIT: CPT | Performed by: OBSTETRICS & GYNECOLOGY

## 2017-08-23 PROCEDURE — 82951 GLUCOSE TOLERANCE TEST (GTT): CPT | Performed by: OBSTETRICS & GYNECOLOGY

## 2017-08-23 PROCEDURE — 36415 COLL VENOUS BLD VENIPUNCTURE: CPT | Performed by: OBSTETRICS & GYNECOLOGY

## 2017-08-23 NOTE — NURSING NOTE
"Chief Complaint   Patient presents with     Prenatal Care       Initial /73 (BP Location: Right arm, Cuff Size: Adult Regular)  Pulse 91  Wt 144 lb 9.6 oz (65.6 kg)  LMP 02/03/2017  SpO2 97%  BMI 23.88 kg/m2 Estimated body mass index is 23.88 kg/(m^2) as calculated from the following:    Height as of 4/10/17: 5' 5.25\" (1.657 m).    Weight as of this encounter: 144 lb 9.6 oz (65.6 kg).  Medication Reconciliation: complete   MIHAI Le 8/23/2017         "

## 2017-08-23 NOTE — MR AVS SNAPSHOT
After Visit Summary   8/23/2017    Carole Hernández    MRN: [id number]           Patient Information     Date Of Birth          1985        Visit Information        Provider Department      8/23/2017 9:30 AM Wicho Marcial MD [facility]        Today's Diagnoses     Pregnancy with history of miscarriage, third trimester    -  1    Antepartum anemia in third trimester        Abnormal glucose tolerance test           Follow-ups after your visit        Follow-up notes from your care team     Return in about 2 weeks (around 9/6/2017) for prenatal visit.      Your next 10 appointments already scheduled     Sep 06, 2017 10:30 AM CDT   ESTABLISHED PRENATAL with Wicho Marcial MD   [facility] ([address] [phone]              Who to contact     If you have questions or need follow up information about today's clinic visit or your schedule please contact [facility] directly at [phone].  Normal or non-critical lab and imaging results will be communicated to you by [employer], letter or phone within 4 business days after the clinic has received the results. If you do not hear from us within 7 days, please contact the clinic through [employer] or phone. If you have a critical or abnormal lab result, we will notify you by phone as soon as possible.  Submit refill requests through [employer] or call your pharmacy and they will forward the refill request to us. Please allow 3 business days for your refill to be completed.          Additional Information About Your Visit        [employer] Information     [employer] gives you secure access to your electronic health record. If you see a primary care provider, you can also send messages to your care team and make appointments. If you have questions, please call your primary care clinic.  If you do not have a primary care provider, please call  [phone] and they will assist you.        Care EveryWhere ID     This is your Care EveryWhere ID. This could be used by other organizations to access your [place] medical records  [id number]        Your Vitals Were     Pulse Last Period Pulse Oximetry BMI (Body Mass Index)          91 02/03/2017 97% 23.88 kg/m2         Blood Pressure from Last 3 Encounters:   08/23/17 112/73   08/08/17 112/74   07/11/17 107/68    Weight from Last 3 Encounters:   08/23/17 144 lb 9.6 oz (65.6 kg)   08/08/17 145 lb (65.8 kg)   07/11/17 136 lb 6.4 oz (61.9 kg)              We Performed the Following     Glucose tolerance gest std 100 gm 3 hr        Primary Care Provider Office Phone # Fax #    Mima Ellie Kapadia, LEILA [facility] [phone]       [address] [facility] [address]        Equal Access to Services     [facility] : Hadii aad ku hadasho Soomaali, waaxda luqadaha, qaybta kaalmada adeegyada, waxay idiin hayaan adeeg janearakorey amor'ashtyn . So [facility] [phone].    ATENCIÓN: Si habla español, tiene a kearney disposición servicios gratuitos de asistencia lingüística. Llame al [phone].    We comply with applicable federal civil rights laws and [place] laws. We do not discriminate on the basis of race, color, national origin, age, disability sex, sexual orientation or gender identity.            Thank you!     Thank you for choosing [facility]  for your care. Our goal is always to provide you with excellent care. Hearing back from our patients is one way we can continue to improve our services. Please take a few minutes to complete the written survey that you may receive in the mail after your visit with us. Thank you!             Your Updated Medication List - Protect others around you: Learn how to safely use, store and throw away your medicines at www.disposemymeds.org.          This list is accurate as of: 8/23/17 11:00 AM.  Always use your most recent med list.                   Brand Name  Dispense Instructions for use Diagnosis    prenatal multivitamin plus iron 27-0.8 MG Tabs per tablet     100 tablet    Take 1 tablet by mouth daily

## 2017-09-06 ENCOUNTER — PRENATAL OFFICE VISIT (OUTPATIENT)
Dept: OBGYN | Facility: CLINIC | Age: 32
End: 2017-09-06
Payer: COMMERCIAL

## 2017-09-06 VITALS
HEART RATE: 120 BPM | SYSTOLIC BLOOD PRESSURE: 108 MMHG | DIASTOLIC BLOOD PRESSURE: 73 MMHG | OXYGEN SATURATION: 98 % | WEIGHT: 148 LBS | BODY MASS INDEX: 24.44 KG/M2

## 2017-09-06 DIAGNOSIS — O09.293 PREGNANCY WITH HISTORY OF MISCARRIAGE, THIRD TRIMESTER: Primary | ICD-10-CM

## 2017-09-06 DIAGNOSIS — Z23 NEED FOR TDAP VACCINATION: ICD-10-CM

## 2017-09-06 DIAGNOSIS — O99.013 ANTEPARTUM ANEMIA IN THIRD TRIMESTER: ICD-10-CM

## 2017-09-06 PROCEDURE — 90471 IMMUNIZATION ADMIN: CPT | Performed by: OBSTETRICS & GYNECOLOGY

## 2017-09-06 PROCEDURE — 99207 ZZC PRENATAL VISIT: CPT | Performed by: OBSTETRICS & GYNECOLOGY

## 2017-09-06 PROCEDURE — 90715 TDAP VACCINE 7 YRS/> IM: CPT | Performed by: OBSTETRICS & GYNECOLOGY

## 2017-09-06 NOTE — NURSING NOTE
Screening Questionnaire for Adult Immunization    Are you sick today?   No   Do you have allergies to medications, food, a vaccine component or latex?   No   Have you ever had a serious reaction after receiving a vaccination?   No   Do you have a long-term health problem with heart disease, lung disease, asthma, kidney disease, metabolic disease (e.g. diabetes), anemia, or other blood disorder?   No   Do you have cancer, leukemia, HIV/AIDS, or any other immune system problem?   No   In the past 3 months, have you taken medications that affect  your immune system, such as prednisone, other steroids, or anticancer drugs; drugs for the treatment of rheumatoid arthritis, Crohn s disease, or psoriasis; or have you had radiation treatments?   No   Have you had a seizure, or a brain or other nervous system problem?   No   During the past year, have you received a transfusion of blood or blood     products, or been given immune (gamma) globulin or antiviral drug?   No   For women: Are you pregnant or is there a chance you could become        pregnant during the next month?   Yes   Have you received any vaccinations in the past 4 weeks?   No     Immunization questionnaire was positive for at least one answer.  Notified [facility].        Per orders of Dr. Marcial, injection of Tdap given by Mercedes Duran. Patient instructed to remain in clinic for 15 minutes afterwards, and to report any adverse reaction to me immediately.       Screening performed by Mercedes Duran on 9/6/2017 at 12:56 PM.

## 2017-09-06 NOTE — MR AVS SNAPSHOT
After Visit Summary   9/6/2017    Carole Hernández    MRN: [id number]           Patient Information     Date Of Birth          1985        Visit Information        Provider Department      9/6/2017 10:30 AM Wicho Marcial MD [facility]        Today's Diagnoses     Pregnancy with history of miscarriage, third trimester    -  1    Antepartum anemia in third trimester           Follow-ups after your visit        Your next 10 appointments already scheduled     Sep 22, 2017 10:45 AM CDT   ESTABLISHED PRENATAL with Wicho Marcial MD   [facility] ([facility])    [address] [place] [id number] [phone]              Who to contact     If you have questions or need follow up information about today's clinic visit or your schedule please contact [facility] directly at [phone].  Normal or non-critical lab and imaging results will be communicated to you by MyChart, letter or phone within 4 business days after the clinic has received the results. If you do not hear from us within 7 days, please contact the clinic through [email or web address].comhart or phone. If you have a critical or abnormal lab result, we will notify you by phone as soon as possible.  Submit refill requests through [employer] or call your pharmacy and they will forward the refill request to us. Please allow 3 business days for your refill to be completed.          Additional Information About Your Visit        MyChart Information     [employer] gives you secure access to your electronic health record. If you see a primary care provider, you can also send messages to your care team and make appointments. If you have questions, please call your primary care clinic.  If you do not have a primary care provider, please call [phone] and they will assist you.        Care EveryWhere ID     This is your Care EveryWhere ID. This could be used by other organizations to access  your [place] medical records  [id number]        Your Vitals Were     Pulse Last Period Pulse Oximetry BMI (Body Mass Index)          120 02/03/2017 98% 24.44 kg/m2         Blood Pressure from Last 3 Encounters:   09/06/17 108/73   08/23/17 112/73   08/08/17 112/74    Weight from Last 3 Encounters:   09/06/17 148 lb (67.1 kg)   08/23/17 144 lb 9.6 oz (65.6 kg)   08/08/17 145 lb (65.8 kg)              Today, you had the following     No orders found for display       Primary Care Provider Office Phone # Fax #    Mima Elliebhavesh Kapadia, APRN [facility] [phone]       [address] [facility] [address]        Equal Access to Services     LUCIAN COLIN : Hadii aad ku hadasho Soomaali, waaxda luqadaha, qaybta kaalmada adeegyada, nicole maldonado . So [facility] [phone].    ATENCIÓN: Si habla español, tiene a kearney disposición servicios gratuitos de asistencia lingüística. Llame al [phone].    We comply with applicable federal civil rights laws and [place] laws. We do not discriminate on the basis of race, color, national origin, age, disability sex, sexual orientation or gender identity.            Thank you!     Thank you for choosing [facility]  for your care. Our goal is always to provide you with excellent care. Hearing back from our patients is one way we can continue to improve our services. Please take a few minutes to complete the written survey that you may receive in the mail after your visit with us. Thank you!             Your Updated Medication List - Protect others around you: Learn how to safely use, store and throw away your medicines at www.disposemymeds.org.          This list is accurate as of: 9/6/17 11:46 AM.  Always use your most recent med list.                   Brand Name Dispense Instructions for use Diagnosis    prenatal multivitamin plus iron 27-0.8 MG Tabs per tablet     100 tablet    Take 1 tablet by mouth daily

## 2017-09-06 NOTE — NURSING NOTE
"Chief Complaint   Patient presents with     Prenatal Care     30.5 weeks       Initial /73 (BP Location: Right arm, Cuff Size: Adult Regular)  Pulse 120  Wt 148 lb (67.1 kg)  LMP 02/03/2017  SpO2 98%  BMI 24.44 kg/m2 Estimated body mass index is 24.44 kg/(m^2) as calculated from the following:    Height as of 4/10/17: 5' 5.25\" (1.657 m).    Weight as of this encounter: 148 lb (67.1 kg).  Medication Reconciliation: hattie Le MA 9/6/2017         "

## 2017-09-06 NOTE — PROGRESS NOTES
TDAP Vaccine (Adacel)      Date Status Dose VIS Date Route Site  Lot# Given By Verified By     9/6/2017 Given 0.5 mL 02/24/2015, Given Today IM LD [employer] [id number] Mercedes Duran CMA --         Exp. Date NDC # Product Time Location External Comment     4/27/2019   --  --      Updated by: Mercedes Duran CMA on 9/6/2017 12:55 PM

## 2017-09-06 NOTE — PROGRESS NOTES
30w5d    Tired.  No HA, visual changes, N/V  3 hour OGT results normal.  Tdap  RTC 2 wk  Wicho Marcial MD

## 2017-09-22 ENCOUNTER — PRENATAL OFFICE VISIT (OUTPATIENT)
Dept: OBGYN | Facility: CLINIC | Age: 32
End: 2017-09-22
Payer: COMMERCIAL

## 2017-09-22 VITALS
BODY MASS INDEX: 24.8 KG/M2 | OXYGEN SATURATION: 97 % | HEART RATE: 92 BPM | DIASTOLIC BLOOD PRESSURE: 71 MMHG | WEIGHT: 150.2 LBS | SYSTOLIC BLOOD PRESSURE: 112 MMHG

## 2017-09-22 DIAGNOSIS — O99.013 ANTEPARTUM ANEMIA IN THIRD TRIMESTER: ICD-10-CM

## 2017-09-22 PROCEDURE — 99207 ZZC PRENATAL VISIT: CPT | Performed by: OBSTETRICS & GYNECOLOGY

## 2017-09-22 RX ORDER — CALCIUM CARBONATE 500 MG/1
1 TABLET, CHEWABLE ORAL DAILY
COMMUNITY
End: 2022-10-13

## 2017-09-22 NOTE — PROGRESS NOTES
33w0d    Tired.  No HA, visual changes, N/V  Hands are numb in the am.  Improve through the day.  RTC 2 wk  Wicho Marcial MD

## 2017-09-22 NOTE — NURSING NOTE
"Chief Complaint   Patient presents with     Prenatal Care     33 weeks       Initial /71 (BP Location: Right arm, Cuff Size: Adult Regular)  Pulse 92  Wt 150 lb 3.2 oz (68.1 kg)  LMP 02/03/2017  SpO2 97%  BMI 24.8 kg/m2 Estimated body mass index is 24.8 kg/(m^2) as calculated from the following:    Height as of 4/10/17: 5' 5.25\" (1.657 m).    Weight as of this encounter: 150 lb 3.2 oz (68.1 kg).  Medication Reconciliation: complete   MIHAI Le 9/22/2017         "

## 2017-09-22 NOTE — MR AVS SNAPSHOT
After Visit Summary   9/22/2017    Carole Hernández    MRN: [id number]           Patient Information     Date Of Birth          1985        Visit Information        Provider Department      9/22/2017 10:45 AM Wicho Marcial MD [facility]        Today's Diagnoses     Antepartum anemia in third trimester           Follow-ups after your visit        Follow-up notes from your care team     Return in about 2 weeks (around 10/6/2017) for prenatal visit.      Your next 10 appointments already scheduled     Oct 04, 2017  1:45 PM CDT   ESTABLISHED PRENATAL with Wicho Marcial MD   [facility]    [address] [place] [id number] [phone]              Who to contact     If you have questions or need follow up information about today's clinic visit or your schedule please contact [facility] directly at [phone].  Normal or non-critical lab and imaging results will be communicated to you by MyChart, letter or phone within 4 business days after the clinic has received the results. If you do not hear from us within 7 days, please contact the clinic through [employer] Inchart or phone. If you have a critical or abnormal lab result, we will notify you by phone as soon as possible.  Submit refill requests through [employer] or call your pharmacy and they will forward the refill request to us. Please allow 3 business days for your refill to be completed.          Additional Information About Your Visit        MyChart Information     [employer] gives you secure access to your electronic health record. If you see a primary care provider, you can also send messages to your care team and make appointments. If you have questions, please call your primary care clinic.  If you do not have a primary care provider, please call [phone] and they will assist you.        Care EveryWhere ID     This is your Care EveryWhere ID. This could  be used by other organizations to access your [place] medical records  [id number]        Your Vitals Were     Pulse Last Period Pulse Oximetry BMI (Body Mass Index)          92 02/03/2017 97% 24.8 kg/m2         Blood Pressure from Last 3 Encounters:   09/22/17 112/71   09/06/17 108/73   08/23/17 112/73    Weight from Last 3 Encounters:   09/22/17 150 lb 3.2 oz (68.1 kg)   09/06/17 148 lb (67.1 kg)   08/23/17 144 lb 9.6 oz (65.6 kg)              Today, you had the following     No orders found for display       Primary Care Provider Office Phone # Fax #    Mima Ellie Kapadia, LEILA [facility] [phone] [address] [facility] [address]        Equal Access to Services     [facility]: Hadii aad ku hadasho Soomaali, waaxda luqadaha, qaybta kaalmada adeegyada, nicole stokesin hayashtyn maldonado . So [facility] [phone].    ATENCIÓN: Si habla español, tiene a kearney disposición servicios gratuitos de asistencia lingüística. Keon al [phone].    We comply with applicable federal civil rights laws and [place] laws. We do not discriminate on the basis of race, color, national origin, age, disability sex, sexual orientation or gender identity.            Thank you!     Thank you for choosing [facility]  for your care. Our goal is always to provide you with excellent care. Hearing back from our patients is one way we can continue to improve our services. Please take a few minutes to complete the written survey that you may receive in the mail after your visit with us. Thank you!             Your Updated Medication List - Protect others around you: Learn how to safely use, store and throw away your medicines at www.disposemymeds.org.          This list is accurate as of: 9/22/17 11:22 AM.  Always use your most recent med list.                   Brand Name Dispense Instructions for use Diagnosis    calcium carbonate 500 MG chewable tablet    TUMS     Take 1 chew tab by mouth  daily        prenatal multivitamin plus iron 27-0.8 MG Tabs per tablet     100 tablet    Take 1 tablet by mouth daily

## 2017-10-04 ENCOUNTER — PRENATAL OFFICE VISIT (OUTPATIENT)
Dept: OBGYN | Facility: CLINIC | Age: 32
End: 2017-10-04
Payer: COMMERCIAL

## 2017-10-04 VITALS
HEART RATE: 106 BPM | DIASTOLIC BLOOD PRESSURE: 75 MMHG | OXYGEN SATURATION: 96 % | BODY MASS INDEX: 25.36 KG/M2 | WEIGHT: 153.6 LBS | SYSTOLIC BLOOD PRESSURE: 112 MMHG

## 2017-10-04 DIAGNOSIS — O09.293 CURRENT PREGNANCY IN THIRD TRIMESTER WITH HISTORY OF SPONTANEOUS ABORTION DURING PRIOR PREGNANCY: Primary | ICD-10-CM

## 2017-10-04 DIAGNOSIS — O99.013 ANTEPARTUM ANEMIA IN THIRD TRIMESTER: ICD-10-CM

## 2017-10-04 PROCEDURE — 99207 ZZC PRENATAL VISIT: CPT | Performed by: OBSTETRICS & GYNECOLOGY

## 2017-10-04 NOTE — PROGRESS NOTES
34w5d    Tired.  No HA, visual changes, N/V  Robitussin for cough  OK for Zantac  RTC 2 wk  Wicho Marcial MD

## 2017-10-04 NOTE — MR AVS SNAPSHOT
After Visit Summary   10/4/2017    Carole Hernández    MRN: [id number]           Patient Information     Date Of Birth          1985        Visit Information        Provider Department      10/4/2017 1:45 PM Wicho Marcial MD [facility]        Today's Diagnoses     Current pregnancy in third trimester with history of spontaneous  during prior pregnancy    -  1    Antepartum anemia in third trimester           Follow-ups after your visit        Follow-up notes from your care team     Return in about 2 weeks (around 10/18/2017) for prenatal visit.      Your next 10 appointments already scheduled     Oct 17, 2017  2:15 PM CDT   ESTABLISHED PRENATAL with Wicho Marcial MD   [facility] ([address] [id number] [phone]              Who to contact     If you have questions or need follow up information about today's clinic visit or your schedule please contact [facility] directly at [phone].  Normal or non-critical lab and imaging results will be communicated to you by Beyond Verbalhart, letter or phone within 4 business days after the clinic has received the results. If you do not hear from us within 7 days, please contact the clinic through OrthAlignt or phone. If you have a critical or abnormal lab result, we will notify you by phone as soon as possible.  Submit refill requests through [employer] or call your pharmacy and they will forward the refill request to us. Please allow 3 business days for your refill to be completed.          Additional Information About Your Visit        Beyond Verbalhart Information     [employer] gives you secure access to your electronic health record. If you see a primary care provider, you can also send messages to your care team and make appointments. If you have questions, please call your primary care clinic.  If you do not have a primary care provider, please call  [phone] and they will assist you.        Care EveryWhere ID     This is your Care EveryWhere ID. This could be used by other organizations to access your [place] medical records  [id number]        Your Vitals Were     Pulse Last Period Pulse Oximetry BMI (Body Mass Index)          106 02/03/2017 96% 25.36 kg/m2         Blood Pressure from Last 3 Encounters:   10/04/17 112/75   09/22/17 112/71   09/06/17 108/73    Weight from Last 3 Encounters:   10/04/17 153 lb 9.6 oz (69.7 kg)   09/22/17 150 lb 3.2 oz (68.1 kg)   09/06/17 148 lb (67.1 kg)              Today, you had the following     No orders found for display       Primary Care Provider Office Phone # Fax #    Mima Ellie Kapadia, LEILA [facility] [phone]       [address] [facility] [address]        Equal Access to Services     [facility]: Hadii aad ku hadasho Soomaali, waaxda luqadaha, qaybta kaalmada adeegyada, waxay idiin hayaan ab maldonado . So [facility] [phone].    ATENCIÓN: Si habla español, tiene a kearney disposición servicios gratuitos de asistencia lingüística. Llame al [phone].    We comply with applicable federal civil rights laws and [place] laws. We do not discriminate on the basis of race, color, national origin, age, disability, sex, sexual orientation, or gender identity.            Thank you!     Thank you for choosing [facility]  for your care. Our goal is always to provide you with excellent care. Hearing back from our patients is one way we can continue to improve our services. Please take a few minutes to complete the written survey that you may receive in the mail after your visit with us. Thank you!             Your Updated Medication List - Protect others around you: Learn how to safely use, store and throw away your medicines at www.disposemymeds.org.          This list is accurate as of: 10/4/17  2:10 PM.  Always use your most recent med list.                   Brand Name  Dispense Instructions for use Diagnosis    calcium carbonate 500 MG chewable tablet    TUMS     Take 1 chew tab by mouth daily        prenatal multivitamin plus iron 27-0.8 MG Tabs per tablet     100 tablet    Take 1 tablet by mouth daily

## 2017-10-04 NOTE — NURSING NOTE
"Chief Complaint   Patient presents with     Prenatal Care     34.5 weeks       Initial /75 (BP Location: Right arm, Cuff Size: Adult Regular)  Pulse 106  Wt 153 lb 9.6 oz (69.7 kg)  LMP 02/03/2017  SpO2 96%  BMI 25.36 kg/m2 Estimated body mass index is 25.36 kg/(m^2) as calculated from the following:    Height as of 4/10/17: 5' 5.25\" (1.657 m).    Weight as of this encounter: 153 lb 9.6 oz (69.7 kg).  Medication Reconciliation: complete   MIHAI Le 10/4/2017         "

## 2017-10-17 ENCOUNTER — PRENATAL OFFICE VISIT (OUTPATIENT)
Dept: OBGYN | Facility: CLINIC | Age: 32
End: 2017-10-17
Payer: COMMERCIAL

## 2017-10-17 VITALS
WEIGHT: 157.8 LBS | BODY MASS INDEX: 26.06 KG/M2 | OXYGEN SATURATION: 96 % | DIASTOLIC BLOOD PRESSURE: 77 MMHG | HEART RATE: 102 BPM | SYSTOLIC BLOOD PRESSURE: 110 MMHG

## 2017-10-17 DIAGNOSIS — O99.013 ANTEPARTUM ANEMIA IN THIRD TRIMESTER: ICD-10-CM

## 2017-10-17 DIAGNOSIS — Z36.85 ANTENATAL SCREENING FOR STREPTOCOCCUS B: ICD-10-CM

## 2017-10-17 DIAGNOSIS — O09.293 CURRENT PREGNANCY IN THIRD TRIMESTER WITH HISTORY OF SPONTANEOUS ABORTION DURING PRIOR PREGNANCY: Primary | ICD-10-CM

## 2017-10-17 PROCEDURE — 99207 ZZC PRENATAL VISIT: CPT | Performed by: OBSTETRICS & GYNECOLOGY

## 2017-10-17 PROCEDURE — 87653 STREP B DNA AMP PROBE: CPT | Performed by: OBSTETRICS & GYNECOLOGY

## 2017-10-17 NOTE — NURSING NOTE
"Chief Complaint   Patient presents with     Prenatal Care     36.4 weeks       Initial /77 (BP Location: Right arm, Cuff Size: Adult Regular)  Pulse 102  Wt 157 lb 12.8 oz (71.6 kg)  LMP 02/03/2017  SpO2 96%  BMI 26.06 kg/m2 Estimated body mass index is 26.06 kg/(m^2) as calculated from the following:    Height as of 4/10/17: 5' 5.25\" (1.657 m).    Weight as of this encounter: 157 lb 12.8 oz (71.6 kg).  Medication Reconciliation: complete   MIHAI Le 10/17/2017         "

## 2017-10-17 NOTE — MR AVS SNAPSHOT
After Visit Summary   10/17/2017    Carole Hernández    MRN: [id number]           Patient Information     Date Of Birth          1985        Visit Information        Provider Department      10/17/2017 2:15 PM Wicho Marcial MD [facility]        Today's Diagnoses      screening for streptococcus B    -  1    Antepartum anemia in third trimester           Follow-ups after your visit        Your next 10 appointments already scheduled     Oct 25, 2017  4:15 PM CDT   ESTABLISHED PRENATAL with Wicho Marcial MD   [facility] ([facility])    [address] [place] [address]  [place] [address]   [phone]            Oct 31, 2017  1:00 PM CDT   ESTABLISHED PRENATAL with Wicho Marcial MD   [facility] ([facility])    [facility]  [place] [address]   [phone]            2017  1:15 PM CST   ESTABLISHED PRENATAL with Wicho Marcial MD   [facility] ([facility])    [address] [place] [address]  [place] [address]   [phone]              Who to contact     If you have questions or need follow up information about today's clinic visit or your schedule please contact [facility] directly at [phone].  Normal or non-critical lab and imaging results will be communicated to you by MyChart, letter or phone within 4 business days after the clinic has received the results. If you do not hear from us within 7 days, please contact the clinic through MyVRhart or phone. If you have a critical or abnormal lab result, we will notify you by phone as soon as possible.  Submit refill requests through Cardpool or call your pharmacy and they will forward the refill request to us. Please allow 3 business days for your refill to be completed.          Additional Information About Your Visit        MyVRharHammer and Grind Information     Cardpool gives you secure access to your  electronic health record. If you see a primary care provider, you can also send messages to your care team and make appointments. If you have questions, please call your primary care clinic.  If you do not have a primary care provider, please call [phone] and they will assist you.        Care EveryWhere ID     This is your Care EveryWhere ID. This could be used by other organizations to access your [place] medical records  [id number]        Your Vitals Were     Pulse Last Period Pulse Oximetry BMI (Body Mass Index)          102 02/03/2017 96% 26.06 kg/m2         Blood Pressure from Last 3 Encounters:   10/17/17 110/77   10/04/17 112/75   09/22/17 112/71    Weight from Last 3 Encounters:   10/17/17 157 lb 12.8 oz (71.6 kg)   10/04/17 153 lb 9.6 oz (69.7 kg)   09/22/17 150 lb 3.2 oz (68.1 kg)              Today, you had the following     No orders found for display       Primary Care Provider Office Phone # Fax #    Mima Ellie Kapadia, LEILA [facility] [phone]       [address] [facility] [address]        Equal Access to Services     TUSHAR COLIN AH: Hadii aad ku hadasho Soomaali, waaxda luqadaha, qaybta kaalmada adeegyada, nicole saul. So [facility] [phone].    ATENCIÓN: Si habla español, tiene a kearney disposición servicios gratuitos de asistencia lingüística. Llame al [phone].    We comply with applicable federal civil rights laws and [place] laws. We do not discriminate on the basis of race, color, national origin, age, disability, sex, sexual orientation, or gender identity.            Thank you!     Thank you for choosing [facility]  for your care. Our goal is always to provide you with excellent care. Hearing back from our patients is one way we can continue to improve our services. Please take a few minutes to complete the written survey that you may receive in the mail after your visit with us. Thank you!             Your Updated  Medication List - Protect others around you: Learn how to safely use, store and throw away your medicines at www.disposemymeds.org.          This list is accurate as of: 10/17/17  2:16 PM.  Always use your most recent med list.                   Brand Name Dispense Instructions for use Diagnosis    calcium carbonate 500 MG chewable tablet    TUMS     Take 1 chew tab by mouth daily        prenatal multivitamin plus iron 27-0.8 MG Tabs per tablet     100 tablet    Take 1 tablet by mouth daily

## 2017-10-17 NOTE — PROGRESS NOTES
36w4d    No HA, visual changes, N/V    Labor plan and warning s/s discussed.   Routine AG. See flowsheet.   She might be transferring care to provider that delivers at [facility].  It is closer to home for her.    RTC 1 wk  GBS testing today.  Wicho Marcial MD

## 2017-10-18 LAB
GP B STREP DNA SPEC QL NAA+PROBE: NEGATIVE
SPECIMEN SOURCE: NORMAL

## 2017-10-25 ENCOUNTER — PRENATAL OFFICE VISIT (OUTPATIENT)
Dept: OBGYN | Facility: CLINIC | Age: 32
End: 2017-10-25
Payer: COMMERCIAL

## 2017-10-25 VITALS
DIASTOLIC BLOOD PRESSURE: 73 MMHG | OXYGEN SATURATION: 97 % | HEART RATE: 104 BPM | SYSTOLIC BLOOD PRESSURE: 116 MMHG | WEIGHT: 161 LBS | BODY MASS INDEX: 26.59 KG/M2

## 2017-10-25 DIAGNOSIS — O99.013 ANTEPARTUM ANEMIA IN THIRD TRIMESTER: ICD-10-CM

## 2017-10-25 PROCEDURE — 99207 ZZC PRENATAL VISIT: CPT | Performed by: OBSTETRICS & GYNECOLOGY

## 2017-10-25 NOTE — MR AVS SNAPSHOT
After Visit Summary   10/25/2017    Carole Hernández    MRN: [id number]           Patient Information     Date Of Birth          1985        Visit Information        Provider Department      10/25/2017 4:15 PM Wicho Marcial MD [facility]        Today's Diagnoses     Antepartum anemia in third trimester           Follow-ups after your visit        Follow-up notes from your care team     Return in about 1 week (around 11/1/2017) for prenatal visit.      Your next 10 appointments already scheduled     Oct 31, 2017  1:00 PM CDT   ESTABLISHED PRENATAL with Wicho Marcial MD   [facility] ([facility])    [address] [place] [id number]   [phone]            Nov 07, 2017  1:15 PM CST   ESTABLISHED PRENATAL with Wicho Marcial MD   [facility] ([facility]    [address] [place] [address]   [phone]              Who to contact     If you have questions or need follow up information about today's clinic visit or your schedule please contact [facility] directly at [phone].  Normal or non-critical lab and imaging results will be communicated to you by MyChart, letter or phone within 4 business days after the clinic has received the results. If you do not hear from us within 7 days, please contact the clinic through MyChart or phone. If you have a critical or abnormal lab result, we will notify you by phone as soon as possible.  Submit refill requests through [employer] or call your pharmacy and they will forward the refill request to us. Please allow 3 business days for your refill to be completed.          Additional Information About Your Visit        [employer] Information     [employer] gives you secure access to your electronic health record. If you see a primary care provider, you can also send messages to your care team and make appointments. If you have questions,  please call your primary care clinic.  If you do not have a primary care provider, please call [phone] and they will assist you.        Care EveryWhere ID     This is your Care EveryWhere ID. This could be used by other organizations to access your [facility] medical records  [id number]        Your Vitals Were     Pulse Last Period Pulse Oximetry BMI (Body Mass Index)          104 02/03/2017 97% 26.59 kg/m2         Blood Pressure from Last 3 Encounters:   10/25/17 116/73   10/17/17 110/77   10/04/17 112/75    Weight from Last 3 Encounters:   10/25/17 161 lb (73 kg)   10/17/17 157 lb 12.8 oz (71.6 kg)   10/04/17 153 lb 9.6 oz (69.7 kg)              Today, you had the following     No orders found for display       Primary Care Provider Office Phone # Fax #    Mima Ellie Kapadia, APRN [facility] [phone]       [address] [facility] [address]        Equal Access to Services     [facility]: Hadii aad ku hadasho Soomaali, waaxda luqadaha, qaybta kaalmada adeegyada, waxay idiin hayaan ab maldonado . So [facility] [phone].    ATENCIÓN: Si habla español, tiene a kearney disposición servicios gratuitos de asistencia lingüística. Llame al [phone].    We comply with applicable federal civil rights laws and [place] laws. We do not discriminate on the basis of race, color, national origin, age, disability, sex, sexual orientation, or gender identity.            Thank you!     Thank you for choosing [facility]  for your care. Our goal is always to provide you with excellent care. Hearing back from our patients is one way we can continue to improve our services. Please take a few minutes to complete the written survey that you may receive in the mail after your visit with us. Thank you!             Your Updated Medication List - Protect others around you: Learn how to safely use, store and throw away your medicines at www.disposemymeds.org.          This list is accurate as of:  10/25/17  5:10 PM.  Always use your most recent med list.                   Brand Name Dispense Instructions for use Diagnosis    calcium carbonate 500 MG chewable tablet    TUMS     Take 1 chew tab by mouth daily        prenatal multivitamin plus iron 27-0.8 MG Tabs per tablet     100 tablet    Take 1 tablet by mouth daily

## 2017-10-25 NOTE — NURSING NOTE
"Chief Complaint   Patient presents with     Prenatal Care     37.5 weeks       Initial /73 (BP Location: Right arm, Cuff Size: Adult Regular)  Pulse 104  Wt 161 lb (73 kg)  LMP 02/03/2017  SpO2 97%  BMI 26.59 kg/m2 Estimated body mass index is 26.59 kg/(m^2) as calculated from the following:    Height as of 4/10/17: 5' 5.25\" (1.657 m).    Weight as of this encounter: 161 lb (73 kg).  Medication Reconciliation: complete   MIHAI Le 10/25/2017         "

## 2017-10-25 NOTE — PROGRESS NOTES
37w5d    No HA, visual changes, N/V   Labor plan and warning s/s discussed.   She plans to deliver at [facility].     RTC 1 wk  Wicho Marcial MD

## 2017-10-31 ENCOUNTER — TELEPHONE (OUTPATIENT)
Dept: OBGYN | Facility: CLINIC | Age: 32
End: 2017-10-31

## 2017-10-31 ENCOUNTER — PRENATAL OFFICE VISIT (OUTPATIENT)
Dept: OBGYN | Facility: CLINIC | Age: 32
End: 2017-10-31
Payer: COMMERCIAL

## 2017-10-31 VITALS
BODY MASS INDEX: 26.49 KG/M2 | HEART RATE: 88 BPM | DIASTOLIC BLOOD PRESSURE: 88 MMHG | WEIGHT: 160.4 LBS | OXYGEN SATURATION: 95 % | SYSTOLIC BLOOD PRESSURE: 126 MMHG

## 2017-10-31 DIAGNOSIS — O09.293 CURRENT PREGNANCY IN THIRD TRIMESTER WITH HISTORY OF SPONTANEOUS ABORTION DURING PRIOR PREGNANCY: Primary | ICD-10-CM

## 2017-10-31 DIAGNOSIS — O99.013 ANTEPARTUM ANEMIA IN THIRD TRIMESTER: ICD-10-CM

## 2017-10-31 PROCEDURE — 99207 ZZC PRENATAL VISIT: CPT | Performed by: OBSTETRICS & GYNECOLOGY

## 2017-10-31 NOTE — TELEPHONE ENCOUNTER
"Patient called stated she was seen by Dr. Marcial today and she had cervical check and not dilating. Patient stated a short time ago she was shopping and she felt like she was getting her period. Patient stated she checked and no vaginal bleeding but noted clear, watery disharge without an odor. Patient denied contractions. Explained she needs to go to [facility] & [facility] to be evaluated. Patient questioned even without contractions. Explained need to determine if she is leaking fld and if she is they will get a delivery plan. Patient stated she is on her way home now by herself. Patient stated she felt comfortable driving to hospital to be assessed since she is not katharina \"or any thing else.\" Explained will call Dr. Aj on call at [facility] to update her. Patient stated she plans to deliver at [facility]. Questioned if she discussed with Dr. Marcial and patient stated she did. Patient stated she recently moved and lives 10 mins from [facility]. Recommended she call insurance to make sure that hospital is covered by her insurance. Patient stated it should be covered since it is a [facility] and they have merged with . 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 Explained not an emergency to go to L & D but would recommend she reports within next 2 hrs. Patient stated she will consult insurance and may have her sister drive her to [facility]. Phone to  Martha and Dr. Marcial verified that patient was planning to deliver at [facility].   Bev Ha RN, BAN    "

## 2017-10-31 NOTE — NURSING NOTE
"Chief Complaint   Patient presents with     Prenatal Care     38.4 weeks       Initial /88 (BP Location: Right arm, Cuff Size: Adult Regular)  Pulse 88  Wt 160 lb 6.4 oz (72.8 kg)  LMP 02/03/2017  SpO2 95%  BMI 26.49 kg/m2 Estimated body mass index is 26.49 kg/(m^2) as calculated from the following:    Height as of 4/10/17: 5' 5.25\" (1.657 m).    Weight as of this encounter: 160 lb 6.4 oz (72.8 kg).  Medication Reconciliation: complete   MIHAI Le 10/31/2017         "

## 2017-10-31 NOTE — MR AVS SNAPSHOT
After Visit Summary   10/31/2017    Carole Hernández    MRN: [id number]           Patient Information     Date Of Birth          1985        Visit Information        Provider Department      10/31/2017 1:00 PM Wicho Marcial MD [facility]        Today's Diagnoses     Current pregnancy in third trimester with history of spontaneous  during prior pregnancy    -  1    Antepartum anemia in third trimester           Follow-ups after your visit        Follow-up notes from your care team     Return in about 1 week (around 2017) for prenatal visit.      Your next 10 appointments already scheduled     2017  1:15 PM CST   ESTABLISHED PRENATAL with Wicho Marcial MD   [facility] ([address] [id number] [phone]              Who to contact     If you have questions or need follow up information about today's clinic visit or your schedule please contact [facility] directly at [phone].  Normal or non-critical lab and imaging results will be communicated to you by [email or web address], letter or phone within 4 business days after the clinic has received the results. If you do not hear from us within 7 days, please contact the clinic through [facility] or phone. If you have a critical or abnormal lab result, we will notify you by phone as soon as possible.  Submit refill requests through [employer] or call your pharmacy and they will forward the refill request to us. Please allow 3 business days for your refill to be completed.          Additional Information About Your Visit        [email or web address]Char"[employer] Information     [employer] gives you secure access to your electronic health record. If you see a primary care provider, you can also send messages to your care team and make appointments. If you have questions, please call your primary care clinic.  If you do not have a primary care provider, please call  [phone] and they will assist you.        Care EveryWhere ID     This is your Care EveryWhere ID. This could be used by other organizations to access your [facility] medical records  [id number]        Your Vitals Were     Pulse Last Period Pulse Oximetry BMI (Body Mass Index)          88 02/03/2017 95% 26.49 kg/m2         Blood Pressure from Last 3 Encounters:   10/31/17 126/88   10/25/17 116/73   10/17/17 110/77    Weight from Last 3 Encounters:   10/31/17 160 lb 6.4 oz (72.8 kg)   10/25/17 161 lb (73 kg)   10/17/17 157 lb 12.8 oz (71.6 kg)              Today, you had the following     No orders found for display       Primary Care Provider Office Phone # Fax #    Mima Ellie Kapadia, APRN [facility] [phone]       [address] [facility] [address]        Equal Access to Services     [facility] : Hadii aad ku hadasho Soomaali, waaxda luqadaha, qaybta kaalmada adeegyada, waxay idiin hayaan colbyeg temo maldonado . So [facility] [phone].    ATENCIÓN: Si habla español, tiene a kearney disposición servicios gratuitos de asistencia lingüística. Llame al [phone].    We comply with applicable federal civil rights laws and [place] laws. We do not discriminate on the basis of race, color, national origin, age, disability, sex, sexual orientation, or gender identity.            Thank you!     Thank you for choosing [facility]  for your care. Our goal is always to provide you with excellent care. Hearing back from our patients is one way we can continue to improve our services. Please take a few minutes to complete the written survey that you may receive in the mail after your visit with us. Thank you!             Your Updated Medication List - Protect others around you: Learn how to safely use, store and throw away your medicines at www.disposemymeds.org.          This list is accurate as of: 10/31/17  1:28 PM.  Always use your most recent med list.                   Brand Name Dispense  Instructions for use Diagnosis    calcium carbonate 500 MG chewable tablet    TUMS     Take 1 chew tab by mouth daily        prenatal multivitamin plus iron 27-0.8 MG Tabs per tablet     100 tablet    Take 1 tablet by mouth daily

## 2017-10-31 NOTE — PROGRESS NOTES
38w4d    No HA, visual changes, N/V  Labor plan and warning s/s discussed.   RTC 1 wk  Wicho Marcial MD

## 2017-11-01 ENCOUNTER — TELEPHONE (OUTPATIENT)
Dept: OBGYN | Facility: CLINIC | Age: 32
End: 2017-11-01

## 2017-11-01 NOTE — TELEPHONE ENCOUNTER
Patient called she needs to get her prenatal records she will be delivering at [facility].    She will come to clinic and sign release of information and I will give her a copy  MIHAI Le 11/1/2017

## 2017-11-10 ENCOUNTER — ANESTHESIA - HEALTHEAST (OUTPATIENT)
Dept: OBGYN | Facility: CLINIC | Age: 32
End: 2017-11-10

## 2017-11-10 ENCOUNTER — TRANSFERRED RECORDS (OUTPATIENT)
Dept: HEALTH INFORMATION MANAGEMENT | Facility: CLINIC | Age: 32
End: 2017-11-10

## 2017-11-10 ASSESSMENT — MIFFLIN-ST. JEOR: SCORE: 1432.97

## 2017-11-11 ENCOUNTER — SURGERY - HEALTHEAST (OUTPATIENT)
Dept: OBGYN | Facility: CLINIC | Age: 32
End: 2017-11-11

## 2017-11-14 ENCOUNTER — COMMUNICATION - HEALTHEAST (OUTPATIENT)
Dept: OBGYN | Facility: CLINIC | Age: 32
End: 2017-11-14

## 2017-12-04 ENCOUNTER — COMMUNICATION - HEALTHEAST (OUTPATIENT)
Dept: HEALTH INFORMATION MANAGEMENT | Facility: CLINIC | Age: 32
End: 2017-12-04

## 2017-12-22 ENCOUNTER — OFFICE VISIT - HEALTHEAST (OUTPATIENT)
Dept: FAMILY MEDICINE | Facility: CLINIC | Age: 32
End: 2017-12-22

## 2017-12-22 DIAGNOSIS — M25.531 BILATERAL WRIST PAIN: ICD-10-CM

## 2017-12-22 DIAGNOSIS — M25.532 BILATERAL WRIST PAIN: ICD-10-CM

## 2017-12-22 ASSESSMENT — MIFFLIN-ST. JEOR: SCORE: 1341.8

## 2018-01-01 ENCOUNTER — COMMUNICATION - HEALTHEAST (OUTPATIENT)
Dept: SCHEDULING | Facility: CLINIC | Age: 33
End: 2018-01-01

## 2018-06-21 ENCOUNTER — COMMUNICATION - HEALTHEAST (OUTPATIENT)
Dept: INTERNAL MEDICINE | Facility: CLINIC | Age: 33
End: 2018-06-21

## 2018-06-22 ENCOUNTER — OFFICE VISIT - HEALTHEAST (OUTPATIENT)
Dept: MIDWIFE SERVICES | Facility: CLINIC | Age: 33
End: 2018-06-22

## 2018-06-22 ENCOUNTER — COMMUNICATION - HEALTHEAST (OUTPATIENT)
Dept: TELEHEALTH | Facility: CLINIC | Age: 33
End: 2018-06-22

## 2018-06-22 ENCOUNTER — OFFICE VISIT - HEALTHEAST (OUTPATIENT)
Dept: INTERNAL MEDICINE | Facility: CLINIC | Age: 33
End: 2018-06-22

## 2018-06-22 DIAGNOSIS — Z30.9 CONTRACEPTIVE MANAGEMENT: ICD-10-CM

## 2018-06-22 DIAGNOSIS — Z30.017 INSERTION OF IMPLANTABLE SUBDERMAL CONTRACEPTIVE: ICD-10-CM

## 2018-06-22 DIAGNOSIS — Z30.432 ENCOUNTER FOR IUD REMOVAL: ICD-10-CM

## 2018-06-22 DIAGNOSIS — Z98.891 HISTORY OF CESAREAN DELIVERY: ICD-10-CM

## 2018-06-22 DIAGNOSIS — Z97.5 NEXPLANON IN PLACE: ICD-10-CM

## 2018-06-22 ASSESSMENT — MIFFLIN-ST. JEOR: SCORE: 1351.32

## 2018-10-30 ENCOUNTER — OFFICE VISIT - HEALTHEAST (OUTPATIENT)
Dept: MIDWIFE SERVICES | Facility: CLINIC | Age: 33
End: 2018-10-30

## 2018-10-30 DIAGNOSIS — N89.8 VAGINAL DISCHARGE: ICD-10-CM

## 2018-10-30 DIAGNOSIS — N89.8 VAGINAL ODOR: ICD-10-CM

## 2018-10-30 LAB
CLUE CELLS: ABNORMAL
TRICHOMONAS, WET PREP: ABNORMAL
YEAST, WET PREP: ABNORMAL

## 2018-10-30 ASSESSMENT — MIFFLIN-ST. JEOR: SCORE: 1378.54

## 2019-11-04 ENCOUNTER — HEALTH MAINTENANCE LETTER (OUTPATIENT)
Age: 34
End: 2019-11-04

## 2020-10-13 ENCOUNTER — VIRTUAL VISIT (OUTPATIENT)
Dept: FAMILY MEDICINE | Facility: OTHER | Age: 35
End: 2020-10-13

## 2020-10-13 NOTE — PROGRESS NOTES
"Date: 10/13/2020 18:46:57  Clinician: Mary Chery  Clinician NPI: [id number]  Patient: Karina Hernández  Patient : 1985  Patient Address: [address] [place] [address]  Patient Phone: [phone]  Visit Protocol: UTI  Patient Summary:  Karina is a 35 year old ( : 1985 ) female who initiated a [facility] Visit for a presumed bladder infection. When asked the question \"Please sign me up to receive news, health information and promotions. \", Karina responded \"No\".   Her symptoms started 1-3 days ago and consist of urinary frequency, urgency, urinary incontinence, dysuria, and foul-smelling urine.   Symptom details   Urine color: The color of her urine is yellow.    Denied symptoms include vaginal discharge, flank pain, abdominal pain, chills, vomiting, vaginal itching, nausea, and feeling as if the bladder is never empty. She does not feel feverish.   Over-the-counter medications or home remedies used to relieve the current symptoms as reported by the patient (free text): Azo   Precipitating events  Karina denies having a sexually transmitted disease.  Pertinent medical history  Karina has had a bladder infection before but has not had any in the past 12 months. Her current symptoms are similar to her previous bladder infection symptoms.   She is not sure what antibiotics have been effective in treating her past bladder infections.   Karina does not get yeast infections when she takes antibiotics and has not been prescribed antibiotics to prevent frequent or repeated bladder infections in the past. She has not experienced problems or side effects with any of the common antibiotics used to treat bladder infections.   Karina does not have a history of kidney stones. She has not used a catheter or been a patient in a hospital or nursing home in the past 2 weeks.   Karina does not smoke or use smokeless tobacco.   She denies pregnancy and denies breastfeeding. She is currently menstruating.     " MEDICATIONS: Azo Cranberry Plus Vit C oral, ALLERGIES: NKDA  Clinician Response:  Dear Karina,  Based on the information you have provided, you likely have an acute urinary tract infection, also called a bladder infection. Bladder infections occur when bacteria from the outside of the body enters the urinary tract. Any part of the urinary system can be infected, but the bladder is the most common.  Medication information  I am prescribing:     Nitrofurantoin monohyd/m-cryst (Macrobid) 100 mg oral capsule. Take 1 capsule by mouth every 12 hours for 5 days. Take this medication with food. There are no refills with this prescription.   The medication I prescribed for your bladder infection is an antibiotic. Continue taking the medication until it is gone even if you feel better.   Yeast infections can be a common side effect of antibiotics. The most common symptom of a yeast infection is itchiness in and around the vagina. Other signs and symptoms include burning, redness, or a thick, white vaginal discharge that looks like cottage cheese and does not have a bad smell.  Unless you are allergic to the following over-the-counter medication, I recommend:     Phenazopyridine (AZO, Uristat, or store brand) oral tablet to treat your discomfort with urination. Swallow 2 tablets 3 times a day for up to 2 days. Take the tablets with a full glass of water after a meal.   This medication helps to relieve symptoms caused by irritation of urinary tract such as pain, burning, and the sudden urge to urinate, but will not cure a bladder infection. The color of your urine will likely turn an orange color and can permanently stain clothing it comes into contact with.  Soft contact lenses can also be permanently stained and should not be worn while taking this medication. If you must wear your contacts, wash your hands after handling the medication.  Stop using this medication immediately and be seen in a clinic or urgent care if your  skin or the whites of your eyes appear yellowish in color.  Over-the-counter medications do not require a prescription. Ask the pharmacist if you have any questions.  Self care  Urination helps to flush bacteria from the urinary tract. For this reason, drinking water and urinating often helps relieve some urinary symptoms and can decrease your risk of getting bladder infections in the future.  Other steps you can take to prevent future bladder infections include:     Wipe front to back after using the bathroom    Urinate after sexual intercourse    Avoid using deodorant sprays, douches, or powders in the vaginal area     When to seek care  Please make an appointment to be seen in a clinic or urgent care if any of the following occur:     You develop new symptoms or your symptoms become worse    You have medication side effects that make it difficult to take them as prescribed    Your symptoms do not improve within 1-2 days of starting treatment    You have symptoms of a bladder infection that return shortly after completing treatment     It is possible to have an allergic reaction to an antibiotic even if you have not had one in the past. If you notice a new rash, significant swelling, or difficulty breathing, stop taking this medication immediately and go to a clinic or urgent care.   Diagnosis: Acute uncomplicated bladder infection  Diagnosis ICD: N39.0  Prescription: nitrofurantoin monohyd/m-cryst (Macrobid) 100 mg oral capsule 10 capsule, 5 days supply. Take 1 capsule by mouth every 12 hours for 5 days. Refills: 0, Refill as needed: no, Allow substitutions: yes  Pharmacy: [facility]/pharmacy #[id number] - [phone] - [address], [place] [address]

## 2020-10-28 ENCOUNTER — COMMUNICATION - HEALTHEAST (OUTPATIENT)
Dept: SCHEDULING | Facility: CLINIC | Age: 35
End: 2020-10-28

## 2020-10-28 ENCOUNTER — COMMUNICATION - HEALTHEAST (OUTPATIENT)
Dept: FAMILY MEDICINE | Facility: CLINIC | Age: 35
End: 2020-10-28

## 2020-10-29 ENCOUNTER — OFFICE VISIT - HEALTHEAST (OUTPATIENT)
Dept: FAMILY MEDICINE | Facility: CLINIC | Age: 35
End: 2020-10-29

## 2020-10-29 DIAGNOSIS — B96.89 BV (BACTERIAL VAGINOSIS): ICD-10-CM

## 2020-10-29 DIAGNOSIS — N76.0 BV (BACTERIAL VAGINOSIS): ICD-10-CM

## 2020-10-29 DIAGNOSIS — N89.8 VAGINAL ODOR: ICD-10-CM

## 2020-10-29 LAB
CLUE CELLS: ABNORMAL
TRICHOMONAS, WET PREP: ABNORMAL
YEAST, WET PREP: ABNORMAL

## 2020-10-30 LAB
C TRACH DNA SPEC QL PROBE+SIG AMP: NEGATIVE
N GONORRHOEA DNA SPEC QL NAA+PROBE: NEGATIVE

## 2020-11-22 ENCOUNTER — HEALTH MAINTENANCE LETTER (OUTPATIENT)
Age: 35
End: 2020-11-22

## 2021-04-12 ENCOUNTER — COMMUNICATION - HEALTHEAST (OUTPATIENT)
Dept: ADMINISTRATIVE | Facility: CLINIC | Age: 36
End: 2021-04-12

## 2021-04-13 ENCOUNTER — OFFICE VISIT - HEALTHEAST (OUTPATIENT)
Dept: FAMILY MEDICINE | Facility: CLINIC | Age: 36
End: 2021-04-13

## 2021-04-13 DIAGNOSIS — B96.89 BV (BACTERIAL VAGINOSIS): ICD-10-CM

## 2021-04-13 DIAGNOSIS — N89.8 VAGINAL ODOR: ICD-10-CM

## 2021-04-13 DIAGNOSIS — N76.0 BV (BACTERIAL VAGINOSIS): ICD-10-CM

## 2021-04-13 LAB
CLUE CELLS: ABNORMAL
TRICHOMONAS, WET PREP: ABNORMAL
YEAST, WET PREP: ABNORMAL

## 2021-05-31 VITALS — WEIGHT: 138.9 LBS | BODY MASS INDEX: 22.32 KG/M2 | HEIGHT: 66 IN

## 2021-05-31 VITALS — BODY MASS INDEX: 25.55 KG/M2 | WEIGHT: 159 LBS | HEIGHT: 66 IN

## 2021-06-01 VITALS — BODY MASS INDEX: 22.66 KG/M2 | WEIGHT: 141 LBS | HEIGHT: 66 IN

## 2021-06-02 VITALS — BODY MASS INDEX: 23.63 KG/M2 | WEIGHT: 147 LBS | HEIGHT: 66 IN

## 2021-06-05 VITALS
SYSTOLIC BLOOD PRESSURE: 119 MMHG | OXYGEN SATURATION: 97 % | DIASTOLIC BLOOD PRESSURE: 79 MMHG | WEIGHT: 135.31 LBS | BODY MASS INDEX: 21.84 KG/M2 | HEART RATE: 97 BPM

## 2021-06-05 VITALS
HEART RATE: 78 BPM | SYSTOLIC BLOOD PRESSURE: 120 MMHG | BODY MASS INDEX: 23.4 KG/M2 | DIASTOLIC BLOOD PRESSURE: 82 MMHG | OXYGEN SATURATION: 100 % | WEIGHT: 145 LBS

## 2021-06-11 ENCOUNTER — OFFICE VISIT - HEALTHEAST (OUTPATIENT)
Dept: MIDWIFE SERVICES | Facility: CLINIC | Age: 36
End: 2021-06-11

## 2021-06-11 DIAGNOSIS — Z97.5 NEXPLANON IN PLACE: ICD-10-CM

## 2021-06-11 ASSESSMENT — MIFFLIN-ST. JEOR: SCORE: 1369.47

## 2021-06-12 NOTE — TELEPHONE ENCOUNTER
RN Triage:    Has history of frequent bacterial vaginosis.    Called earlier today requesting refill of metronidazole gel.  Has not heard back from anyone.  Has unusual vaginal odor.  No discharge or itching.  No pain.  Transferred to  for in person office visit tomorrow.    Agustina Correa RN  [facility] Nurse Advisor

## 2021-06-12 NOTE — PROGRESS NOTES
Assessment:   1. BV (bacterial vaginosis)  2. Vaginal odor  Discussed diagnosis and treatment with patient.   - metroNIDAZOLE (METROGEL VAGINAL) 0.75 % vaginal gel; Insert into the vagina daily. Insert one applicator full into the vagina at night for 5 days  Dispense: 70 g; Refill: 1  - Wet Prep, Vaginal  - Chlamydia trachomatis & Neisseria gonorrhoeae, Amplified Detection  - metroNIDAZOLE (METROGEL VAGINAL) 0.75 % vaginal gel; Insert into the vagina daily. Insert one applicator full into the vagina at night for 5 days  Dispense: 70 g; Refill: 1     Plan:   Symptomatic local care discussed.  Vaginal antibiotic see orders.  Follow up with PCP if needed    Subjective:   Carole Hernández is a 35 y.o. female who presents for evaluation of an abnormal vaginal odor. Symptoms have been present for 3 days. Vaginal symptoms: odor. Contraception: Norplant. She denies abnormal bleeding, blisters, burning, dyspareunia, lesions, local irritation, pain, urinary symptoms of chills, cloudy urine, dysuria, hematuria, lower abdominal pain, urinary frequency, urinary hesitancy, urinary incontinence and urinary urgency, vulvar itching and warts Sexually transmitted infection risk: very low risk of STD exposure. Menstrual flow: regular every 28-30 days.    The following portions of the patient's history were reviewed and updated as appropriate: allergies, current medications and problem list    Review of Systems  General: Denies general constitutional problems  Eyes: Denies problems  Ears/Nose/Throat: Denies problems  Cardiovascular: Denies problems  Respiratory: Denies problems  Gastrointestinal: Denies problems  Genitourinary: Denies problems  Musculoskeletal: Denies problems  Skin: Denies problems  Neurologic: Denies problems  Psychiatric: Denies problems  Endocrine: Denies problems  Heme/Lymphatic: Denies problems  Allergic/Immunologic: Denies problems      Objective:         Vitals:    10/29/20 1328   BP: 119/79   Pulse: 97   SpO2:  97%   Weight: 135 lb 5 oz (61.4 kg)     Physical Exam:  General Appearance: Alert, cooperative, no distress, appears stated age  Head: Normocephalic, without obvious abnormality, atraumatic  Eyes: PERRL, conjunctiva/corneas clear, EOM's intact  Pelvic:Normally developed genitalia with no external lesions or eruptions. Vagina and cervix show no lesions, inflammation, discharge or tenderness. No cystocele, No rectocele. Uterus Normal.  No adnexal mass or tenderness.  Neurologic: Normal

## 2021-06-12 NOTE — TELEPHONE ENCOUNTER
RN cannot approve Refill Request    RN can NOT refill this medication med is not covered by policy/route to provider. Last office visit: 12/22/2017 Gladys Mccurdy NP Last Physical: Visit date not found Last MTM visit: Visit date not found Last visit same specialty: 12/22/2017 Gladys Mccurdy NP.  Next visit within 3 mo: Visit date not found  Next physical within 3 mo: Visit date not found      Kati Ceballos, [facility] Triage/Med Refill 10/28/2020    Requested Prescriptions   Pending Prescriptions Disp Refills     metroNIDAZOLE (METROGEL VAGINAL) 0.75 % vaginal gel 70 g 0     Sig: Insert into the vagina daily. Insert one applicator full into the vagina at night for 5 days       There is no refill protocol information for this order

## 2021-06-14 NOTE — ANESTHESIA PREPROCEDURE EVALUATION
Anesthesia Evaluation      Patient summary reviewed     Airway   Mallampati: II  Neck ROM: full   Pulmonary - negative ROS and normal exam    breath sounds clear to auscultation                         Cardiovascular - negative ROS and normal exam  Rhythm: regular  Rate: normal,         Neuro/Psych - negative ROS     Endo/Other - negative ROS      GI/Hepatic/Renal - negative ROS           Dental - normal exam                        Anesthesia Plan  Planned anesthetic: epidural    ASA 2     Anesthetic plan and risks discussed with: patient and spouse    Post-op plan: routine recovery

## 2021-06-14 NOTE — ANESTHESIA CARE TRANSFER NOTE
Last vitals:   Vitals:    11/11/17 0523   BP: 127/58   Pulse: (!) 105   Resp: 20   Temp: 37.1  C (98.8  F)   SpO2: 100%     Patient's level of consciousness is awake and drowsy  Spontaneous respirations: yes  Maintains airway independently: yes  Dentition unchanged: yes  Oropharynx: oropharynx clear of all foreign objects    QCDR Measures:  ASA# 20 - Surgical Safety Checklist: WHO surgical safety checklist completed prior to induction  PQRS# 430 - Adult PONV Prevention: 4558F-8P - Pt did NOT receive => 2 anti-emetic agents  ASA# 8 - Peds PONV Prevention: NA - Not pediatric patient, not GA or 2 or more risk factors NOT present  PQRS# 424 - Darlyn-op Temp Management: 4559F - At least one body temp DOCUMENTED => 35.5C or 95.9F within required timeframe  PQRS# 426 - PACU Transfer Protocol: - Transfer of care checklist used  ASA# 14 - Acute Post-op Pain: ASA14B - Patient did NOT experience pain >= 7 out of 10

## 2021-06-14 NOTE — ANESTHESIA POSTPROCEDURE EVALUATION
Patient: Carole Hernández   SECTION  Anesthesia type: epidural    Patient location: PACU  Last vitals:   Vitals:    17 0640   BP: (!) 188/63   Pulse: 100   Resp: 18   Temp:    SpO2:      Post vital signs: stable  Level of consciousness: awake and responds to simple questions  Post-anesthesia pain: pain controlled  Post-anesthesia nausea and vomiting: no  Pulmonary: unassisted, return to baseline  Cardiovascular: stable and blood pressure at baseline  Hydration: adequate  Anesthetic events: no    QCDR Measures:  ASA# 11 - Darlyn-op Cardiac Arrest: ASA11B - Patient did NOT experience unanticipated cardiac arrest  ASA# 12 - Darlyn-op Mortality Rate: ASA12B - Patient did NOT die  ASA# 13 - PACU Re-Intubation Rate: NA - No ETT / LMA used for case  ASA# 10 - Composite Anes Safety: ASA10A - No serious adverse event    Additional Notes:

## 2021-06-14 NOTE — ANESTHESIA PROCEDURE NOTES
Epidural Block    Patient location during procedure: OB  Time Called: 11/10/2017 6:20 AM  Reason for Block:labor epidural  Preanesthetic Checklist  Completed: patient identified, risks, benefits, and alternatives discussed, timeout performed, consent obtained, at patient's request, airway assessed, oxygen available, suction available, emergency drugs available and hand hygiene performed  Procedure  Patient position: sitting  Prep: ChloraPrep  Patient monitoring: continuous pulse oximetry, heart rate and blood pressure  Injection technique: SARAH BETH saline    Needle  Needle type: Ronit   Needle gauge: 18 G

## 2021-06-14 NOTE — PROGRESS NOTES
"Assessment/Plan:     1. Bilateral wrist pain  High suspicion for bilateral carpal tunnel although Tinel and Phalen testing negative.  Common in pregnancy, however symptoms appear to be worsening.  Referral for EMG testing due to worsening pain and dysfunction.  In the meantime, patient will continue bilateral wrist splints at night and Tylenol/Ibuprofen for pain.  - EMG- Both Arms; Future        Subjective:     Carole Hernández is a 32 y.o. female who presents with bilateral wrist pain.  Symptoms started during her pregnancy approximately 2 months ago.  Patient is 6 weeks postpartum with her first child.  Complains of constant wrist pain; R>L.  Pain is worse at night.  Her previous OB/GYN gave her bilateral wrist splints to wear, but it does not seem to be helping.  Denies any numbness/tingling into the hands or fingers, but more of a \"burning\" sensation.  She is having difficulty gripping things, stirring things, and doing simple tasks like and snapping her bra.  Prior to maternity leave, patient was working at [employer] on a computer all day long.  She plans on returning to work in February.  Currently, patient is utilizing ibuprofen and Tylenol for pain.  It does take the edge off, but not completely.      The following portions of the patient's history were reviewed and updated as appropriate: allergies, current medications, past family history, past medical history, past social history, past surgical history and problem list.    Review of Systems  Pertinent items are noted in HPI.     Objective:     /76 (Patient Site: Right Arm, Patient Position: Sitting, Cuff Size: Adult Regular)  Pulse 72  Ht 5' 6\" (1.676 m)  Wt 138 lb 14.4 oz (63 kg)  LMP 12/21/2017 (Exact Date)  Breastfeeding? No  BMI 22.42 kg/m2    General Appearance: Alert, cooperative, no distress, appears stated age  Extremities: Bilateral wrists normal, atraumatic, no cyanosis or edema.  Tinel and Phalen testing negative.   strength equal " bilaterally.      Gladys Mccurdy, NP-C

## 2021-06-16 NOTE — PROGRESS NOTES
Assessment & Plan     Vaginal odor  We will treat empirically with topical metronidazole.  Unclear if her last 2 days of medication use would affect today's wet prep results  - metroNIDAZOLE (METROGEL VAGINAL) 0.75 % vaginal gel  Dispense: 70 g; Refill: 1  - Wet Prep, Vaginal    10 minutes spent on the date of the encounter doing chart review, history and exam, documentation and further activities per the note       No follow-ups on file.    Navin Galvan, CNP  [facility]   Carole Hernández is 35 y.o. and presents today for the following health issues   HPI       She has noticed some odor emanating from her vagina over the last few days.  She used a couple doses of her metronidazole gel that she had leftover from her prior prescription and is unsure if this may affect today's wet prep results.    Topical metronidazole has worked quite well for her for prior BV episodes    Review of Systems  Negative      Objective    /82   Pulse 78   Wt 145 lb (65.8 kg)   SpO2 100%   BMI 23.40 kg/m    Body mass index is 23.4 kg/m .  Physical Exam  Normal internal/external female genitalia

## 2021-06-16 NOTE — TELEPHONE ENCOUNTER
Patient needs an appointment for possible wet prep if she thinks that she has BV. Please help her schedule.

## 2021-06-16 NOTE — TELEPHONE ENCOUNTER
Reason for Call:  Medication or medication refill:    Do you use a [facility]?  Name of the pharmacy and phone number for the current request:  [facility]  [phone]    Name of the medication requested:   metroNIDAZOLE (METROGEL VAGINAL) 0.75 % vaginal gel    Can we leave a detailed message on this number? Yes    Phone number patient can be reached at: Home number on file [phone] (home)    Best Time: any    Call taken on 4/12/2021 at 2:10 PM by Laura L Goldberg

## 2021-06-18 NOTE — PROGRESS NOTES
Subjective:     Carole Hernández is a 32 y.o. female who presents for birth control consultation. She is a prior patient of Dr. Purcell who will be closing his practice next month.  Current contraception method: IUD, Mirena. Sexually transmitted infection risk: very low risk of STD exposure. LMP Patient's last menstrual period was 2018 (approximate).. Menstrual flow: irregular. Has used Mirena x 2 for contraception in the past. Pertinent past medical history: negative for contraindications to progestin only methods.    Desires IUD removal today due to dyspareunia and start Nexplanon to be placed today.     Menstrual History:  OB History      Para Term  AB Living    2 1 1  1 1    SAB TAB Ectopic Multiple Live Births        1         Menarche age: 14  Patient's last menstrual period was 2018 (approximate).       The following portions of the patient's history were reviewed and updated as appropriate: allergies, current medications, past family history, past medical history, past social history, past surgical history and problem list.    PMH:  No known contraindications to Nexplanon      No current or past history of thrombosis or thromboembolic disorders       No hepatic tumors or active liver disease       No undiagnosed abnormal genital bleeding       No known or suspected carcinoma of the breast or personal history of breast cancer       No hypersensitivity to any of the components of NEXPLANON      No known history of keloids  NEXPLANON has been discussed with healthcare provider who answered all   questions. It was explained that there are benefits as well as risks with using NEXPLANON. The patient understands that there are other birth control methods and that each has its own benefits and risks.   She also understands that she needs to sign a consent form to show that she is making an   informed and careful decision to use NEXPLANON, and that she has read and understood the  following points.     NEXPLANON helps to keep her from getting pregnant.     No contraceptive method is 100% effective, including NEXPLANON.     NEXPLANON has an implant that contains a hormone.     It is important to have the NEXPLANON implant placed in the arm at the right time ofthe menstrual cycle.     After the implant is placed in my arm, the patient should check that it is in place by gently pressing her fingertips over the skin where the implant was placed. She should be able to feel the implant.     The implant must be removed at the end of three years. The implant can be removed sooner if she wants it removed.     If she has trouble finding a healthcare provider to remove the implant, she can call [phone] for help.     The implant is placed under the skin of the arm during a procedure done in a healthcare provider s office. There is a slight risk of getting a scar or an infection from this procedure.     Removal is usually a minor procedure. Sometimes, removal may be more difficult. Special procedures, including surgery in the hospital, may be needed. Difficult removals may cause pain and scarring and may result in injury to nerves and blood vessels. If the implant is not removed, its effects may continue.     Most women have changes in their menstrual bleeding patterns while using NEXPLANON. Bleeding may be irregular, lighter or heavier, or bleeding may completely stop. If the patient thinks she is pregnant, she should contact my healthcare provider as soon as possible.     the patient verbalizes her understanding of the warning signs for problems with NEXPLANON. She has been advised to seek medical attention if any warning signs appear.     She should tell all her healthcare providers that she is using NEXPLANON.     The patient was advised to have a medical checkup regularly and at any time she is having problems.     NEXPLANON does not provide protection from HIV infection (AIDS) or any other  sexually transmitted diseases.   After learning about NEXPLANON, the patient chose to use NEXPLANON.     ROS: 10 point review of symptoms otherwise negative except as detailed in HPI.    Objective:   Physical Exam   Constitutional: She appears well-developed and well-nourished. No distress.   Cardiovascular: Normal rate and normal heart sounds.   No murmur heard.   Pulmonary/Chest: Effort normal and breath sounds normal.   Neurological: She is alert.   Skin: She is not diaphoretic.   Psychiatric: She has a normal mood and affect.     LABS: UPT not checked because transitioning from Mirena IUD to Nexplanon without interruption      PROCEDURE:  A speculum was placed and the IUD strings were visualized.  The IUD strings were grasped with a ring forceps and gentle traction was applied.  The Mirena IUD was easily removed, shaft of IUDwas likely in cervix thus causing the dyspareunia, and was noted to be intact. Shown to pt and discarded.  The speculum was then removed.    PROCEDURE NOTE for Nexplanon placement:  She elected to proceed with the placement after the risks and benefits were discussed. Denies allergy to local anesthesia, keloid formation.     Consent signed and will be scanned in EMR.     After cleansing left (non-dominant) arm above the elbow, 2 mL of 1% Lidocaine was administered along the planned injection site for Nexplanon. Insertion site cleansed with iodine. Nexplanon was then inserted 8cm above the medial epicondyle of the humerus, in the groove between the biceps and the triceps (sulcus bicipitalis medialis). Palpation revealed subdermal placement; the patient was able to feel implant as well.     Bleeding was minimal and a pressure dressing was applied with instructions to leave this in place for 24 hours. Pt was instructed to observe for signs/symptoms of any infection (localized tenderness, pain, inflammation) or excessive bruising.  No apparent distress at completion of procedure. No  complications.     Nexplanon LOT #: [id number]  Exp 11/2020  NDC# [id number]          Assessment:    32 y.o., discontinuing IUD, starting Nexplanon.     Birth control consult  Mirena IUD removal well tolerated without complication  Appropriate candidate for Nexplanon  Nexplanon placed without difficulty    Plan:      -After discussion of methods, would like to use Neplanon implant.   -Written and verbal information given on this method.  PAINS  warning signs reviewed.   -Labs: none  -Return to clinic as needed.    TT with patient 20 mns establishing pt with practice and providing contraception counseling, 20m for procedure, >50% time spent in counseling or coordination of care.     Carol Guzman, DNP, APRN, CNM

## 2021-06-18 NOTE — PROGRESS NOTES
Patient was here to have her IUD removed the Nexplanon inserted.  However this writer does not do Nexplanon insertion.  I explained to her that I would do IUD removal and insertion but I do not to Nexplanon insertions.  We did catch this on the schedule yesterday and called her to let her know this but unfortunately she missed the information.  This is her third IUD.  She has had it in since February and she wants to take it out because her  feels it during intercourse.  She has had her strings checked by her OB/GYN and they thought everything was in the right position but still he feels that so she wants to take it out and get Nexplanon inserted.  She is not interested in the oral contraceptive pill and or Depo-Provera or another IUD.  Given the situation and this will be a no charge visit and I will have her reschedule with 1 of our providers who does do Nexplanon insertion.  Refer her to our [place] site to see either Dr. Pete, the nurse midwives or Ana Laura Cage to do Nexplanon insertions.  She is agreeable to this plan.  Mckenzie Brandon MD

## 2021-06-21 NOTE — PROGRESS NOTES
Assessment:      Vaginal odor/discharge.      Plan:      Symptomatic local care discussed.  Medications dependent on test results  will call with results.  May LVM on phone number listed  May try tub soaks with baking soda/vinegar and daily probiotic     Subjective:       Carole Hernández is a 33 y.o. female who presents for evaluation of an abnormal vaginal discharge. Symptoms have been present for 2 weeks. Vaginal symptoms: discharge described as clear and odor. Contraception: Nexplanon. She denies burning, local irritation and urinary symptoms of dysuria and urinary frequency Sexually transmitted infection risk: very low risk of STD exposure. Menstrual flow: regular every 28-30 days.  Discussed progesterone containing birth control can increase amount and thickness of vaginal discharge.  She has had bacterial vaginosis once in the past, but it was many years ago.    The following portions of the patient's history were reviewed and updated as appropriate: allergies, current medications, past medical history, past social history and problem list.      Review of Systems  A 12 point comprehensive review of systems was negative except as noted.    Genitourinary: vaginal discharge and odor x 2 weeks     Objective:        Physical Examination: General appearance - alert, well appearing, and in no distress  Mental status - alert, oriented to person, place, and time  Abdomen - soft, nontender, nondistended, no masses or organomegaly  Pelvic - VULVA: normal appearing vulva with no masses, tenderness or lesions, VAGINA: normal appearing vagina with normal color, no lesions, vaginal discharge - creamy and mucoid, CERVIX: normal appearing cervix without discharge or lesions, ADNEXA: normal adnexa in size, nontender and no masses

## 2021-06-26 NOTE — PROGRESS NOTES
SUBJECTIVE:    HPI:    35 y.o. female presenting today for Nexplanon removal and reinsertion.     She is right hand dominant.  She has 1 child(hasmukh) age(d) 3.  She is not breastfeeding.  Current contraception: nexplanon placed 6/24/2018, due for removal  Last sexual activity: She denies unprotected sexual activity in past 2 weeks.     PMH:  No known contraindications to Nexplanon      No current or past history of thrombosis or thromboembolic disorders       No hepatic tumors or active liver disease       No undiagnosed abnormal genital bleeding       No known or suspected carcinoma of the breast or personal history of breast cancer       No hypersensitivity to any of the components of NEXPLANON      No known history of keloids  NEXPLANON has been discussed with healthcare provider who answered all   questions. It was explained that there are benefits as well as risks with using NEXPLANON. The patient understands that there are other birth control methods and that each has its own benefits and risks.   She also understands that she needs to sign a consent form to show that she is making an   informed and careful decision to use NEXPLANON, and that she has read and understood the following points.     NEXPLANON helps to keep her from getting pregnant.     No contraceptive method is 100% effective, including NEXPLANON.     NEXPLANON has an implant that contains a hormone.     It is important to have the NEXPLANON implant placed in the arm at the right time ofthe menstrual cycle.     After the implant is placed in my arm, the patient should check that it is in place by gently pressing her fingertips over the skin where the implant was placed. She should be able to feel the implant.     The implant must be removed at the end of three years. The implant can be removed sooner if she wants it removed.     If she has trouble finding a healthcare provider to remove the implant, she can call [phone] for help.     The  implant is placed under the skin of the arm during a procedure done in a healthcare provider s office. There is a slight risk of getting a scar or an infection from this procedure.     Removal is usually a minor procedure. Sometimes, removal may be more difficult. Special procedures, including surgery in the hospital, may be needed. Difficult removals may cause pain and scarring and may result in injury to nerves and blood vessels. If the implant is not removed, its effects may continue.     Most women have changes in their menstrual bleeding patterns while using NEXPLANON. Bleeding may be irregular, lighter or heavier, or bleeding may completely stop. If the patient thinks she is pregnant, she should contact my healthcare provider as soon as possible.     the patient verbalizes her understanding of the warning signs for problems with NEXPLANON. She has been advised to seek medical attention if any warning signs appear.     She should tell all her healthcare providers that she is using NEXPLANON.     The patient was advised to have a medical checkup regularly and at any time she is having problems.     NEXPLANON does not provide protection from HIV infection (AIDS) or any other sexually transmitted diseases.   After learning about NEXPLANON, the patient chose to use NEXPLANON.     ROS: 10 point review of symptoms otherwise negative except as detailed in HPI.     OBJECTIVE:  Physical Exam   Constitutional: She appears well-developed and well-nourished. No distress.   Cardiovascular: Normal rate and normal heart sounds.   No murmur heard.   Pulmonary/Chest: Effort normal and breath sounds normal.   Neurological: She is alert.   Skin: She is not diaphoretic.   Psychiatric: She has a normal mood and affect.     LABS: UPT not checked because continuing nexplanon      ASSESSMENT:   Appropriate candidate for Nexplanon    PLAN & PROCEDURE NOTE:  She elected to proceed with the placement after the risks and benefits were  discussed. Denies allergy to local anesthesia, keloid formation.     Consent signed and will be scanned in EMR.     Procedure Note - Nexplanon Removal:  Verbal informed consent obtained after Discussion of risks/benefits including risk of pain, bleeding, infection, difficulty with removal and possible need to extend incisions for removal, and permanent scar formation.     After cleansing left (non-dominant) arm above the elbow, the removal and reInsertion site cleansed with antiseptic. After anesthetizing the skin overlaying distal end of Nexplanon, small puncture incision approx 3 mm wide made in near original insertion site/scar.  Nexplanon trang was grasped with clamp and easily removed in its entirity using gentle traction.  Palpated skin overlaying former Nexplanon placement site; no residual trang palpated.  Removed Nexplanon appears intact, no pieces missing.  Removed Nexplanon was shown to patient and discarded to medical waste.     Nexplanon was then inserted in the same site 8cm above the medial epicondyle of the humerus, in the groove between the biceps and the triceps (sulcus bicipitalis medialis). Palpation revealed subdermal placement; the patient was able to feel implant as well.     Bleeding was minimal and a pressure dressing was applied with instructions to leave this in place for 24 hours. Pt was instructed to observe for signs/symptoms of any infection (localized tenderness, pain, inflammation) or excessive bruising.  No apparent distress at completion of procedure. No complications.     Nexplanon LOT #: [id number]  Exp 2023 MAR 19  NDC# [id number]    Carol Guzman, WAN, APRN, CNM  [facility]  Midwifery

## 2021-06-26 NOTE — PATIENT INSTRUCTIONS - HE
A Healthy You!    Getting and Staying Active  Why should I exercise?   Exercise, being physically active, is very important for all women.   Being active can help you:   Lose or maintain weight   Have more energy   Sleep better   Enjoy sex more   Relieve stress and think better   Lower the chance you will have heart disease, high blood pressure, and diabetes   Strengthen your bones and muscles   Have fewer hot flashes if you are older   How active do I have to be to get the bene?ts of exercise?   Studies show that as little as 15 minutes of moderate exercise--like fast walking or dancing--3 times a week can improve the health of your heart. Ifyou want to get the best benefits from exercise, try to increase your physical activity to at least 30 minutes, 5 times a week. If you have a serious health problem,be sure to talk with your health care provider before starting an exercise program.    Taking Care of your Health: Health Care Maintenance Screening recommendations  In all the things women do, taking care of everything and everybody else, they sometimes forget to take care of themselves. This handout reviews the health screenings and vaccines that are recommended for women of all ages. Talk with yourhealth care provider about which tests and vaccines you need. The chart below lists the screening tests and vaccinations recommended for healthy women who do not have a high risk for most diseases.   The recommendations in thischart are guidelines only. For some tests and vaccines, the chart says you should talk to your health care provider.This is because the best recommendations for you depend on your personal health history. Your health care provider may suggest more frequent testing or additional tests ifyou havea higher chance of getting some diseases    Planning Your Family: Developing a Reproductive Life Plan    Planning ahead can help you avoid getting pregnant when you don t want to be pregnant and also be in  "good health if and when you do decide to become pregnant. Many women have at least one pregnancy in their lives, even if it was not planned. In fact, about half of all pregnancies are not planned. Getting pregnant when you did not plan it can be a problem, or it can turn into a happy event. Planning pregnancy leads to healthier pregnancies, healthier mothers, and healthier families.  Although many women want to have a family, not everyone wants to have children. More and more women are childless by choice (also known as childfree). Whether to have children is a personal choice that only you can make. It s okay not to want children! If you never want to get pregnant, it is important to make sure you always use very effective birth control, such as an intrauterine device, the birth control implant, female sterilization (having your tubes tied), or your partner having a vasectomy.    Reliable resources:        American College of Nurse-Midwives (ACNM) http://www.midwife.org/; look at the informational handouts at http://www.midwife.org/Share-With-Women        Women's Health.gov:  http://www.womenshealth.gov/a-z-topics/index.html    FDA - Nutrition  www.mypyramid.gov  Under \"For Consumers,\" click on \"pregnant and breastfeeding women.\"      Vaccines : Centers for Disease Control and Prevention (CDC) http://www.cdc.gov/vaccines/     [facility] www.[place]inic.com     When researching information on the web, question the validity of websites.  The [employer] .gov, .edu and.org tend to be more reliable information.  If there are a lot of advertisements, be cautious of the information provided. Stay away from blogs and chat rooms please!          Nutrition and supplements:     Daily multivitamin vitamin (with 400 - 1000 mcg folic acid).  Take with food as needed.     4-5 servings of dairy or other calcium rich foods (fish, leafy greens, soy) per day - if not, take 500-1000 mg additional calcium (Tums, pills, chews). Take with " dairy.     Vitamin D3 4000 IU geltab daily. Vitamin D rich foods: Cod Liver Oil (1Tbsp), [place], Mackerel, Tuna, fortified milk and yogurt, Beef and liver, sardines, egg, fortified cereals, swiss cheese.  Take supplements with fattiest meal.       2-3 (4) oz servings of fish, seafood, nuts (walnuts & almonds), oils, avocado per week - if not, take Omega 3 Fatty acids: DHA & GEORGES 1418-6735 mg per day.  Other names: cod liver oil, fish oil. Take with fattiest meal.

## 2021-07-03 NOTE — ADDENDUM NOTE
Addendum Note by Raj Isbell APRN, CNM at 10/30/2018 12:57 PM     Author: Raj Isbell APRN, CNM Service: -- Author Type: Midwife    Filed: 10/30/2018 12:57 PM Encounter Date: 10/30/2018 Status: Signed    : Raj Isbell APRN, CNM (Midwife)    Addended by: RAJ ISBELL on: 10/30/2018 12:57 PM        Modules accepted: Orders

## 2021-07-06 VITALS
DIASTOLIC BLOOD PRESSURE: 76 MMHG | WEIGHT: 145 LBS | HEIGHT: 66 IN | HEART RATE: 80 BPM | BODY MASS INDEX: 23.3 KG/M2 | SYSTOLIC BLOOD PRESSURE: 110 MMHG

## 2021-07-14 PROBLEM — Z34.90 PREGNANT: Status: RESOLVED | Noted: 2017-11-10 | Resolved: 2017-12-22

## 2021-07-27 DIAGNOSIS — B96.89 BV (BACTERIAL VAGINOSIS): Primary | ICD-10-CM

## 2021-07-27 DIAGNOSIS — N76.0 BV (BACTERIAL VAGINOSIS): Primary | ICD-10-CM

## 2021-07-27 RX ORDER — METRONIDAZOLE 7.5 MG/G
GEL VAGINAL
Qty: 70 G | Refills: 0 | Status: SHIPPED | OUTPATIENT
Start: 2021-07-27 | End: 2021-09-21

## 2021-07-27 RX ORDER — METRONIDAZOLE 7.5 MG/G
GEL VAGINAL
COMMUNITY
Start: 2021-04-13 | End: 2021-07-27

## 2021-07-27 NOTE — TELEPHONE ENCOUNTER
Reason for Call:  Medication or medication refill:    Do you use a [facility]?  Name of the pharmacy and phone number for the [facility]     Name of the medication requested:  metroNIDAZOLE (METROGEL VAGINAL) 0.75 % vaginal gel     Other request: na    Can we leave a detailed message on this number? YES    Phone number patient can be reached at: Home number on file [phone] (home)    Best Time: any     Call taken on 7/27/2021 at 9:56 AM by Laura L Goldberg, ARRT

## 2021-09-18 ENCOUNTER — HEALTH MAINTENANCE LETTER (OUTPATIENT)
Age: 36
End: 2021-09-18

## 2021-09-21 PROBLEM — O99.013 ANTEPARTUM ANEMIA IN THIRD TRIMESTER: Status: RESOLVED | Noted: 2017-08-11 | Resolved: 2021-09-21

## 2021-09-21 PROBLEM — O09.299 PREGNANCY WITH HISTORY OF MISCARRIAGE: Status: RESOLVED | Noted: 2017-04-10 | Resolved: 2021-09-21

## 2021-09-21 PROBLEM — Z98.891 HISTORY OF CESAREAN DELIVERY: Status: ACTIVE | Noted: 2018-06-22

## 2021-09-21 PROBLEM — Z97.5 NEXPLANON IN PLACE: Status: ACTIVE | Noted: 2018-06-22

## 2022-01-03 ENCOUNTER — TELEPHONE (OUTPATIENT)
Dept: FAMILY MEDICINE | Facility: CLINIC | Age: 37
End: 2022-01-03
Payer: COMMERCIAL

## 2022-01-03 DIAGNOSIS — N89.8 VAGINAL ODOR: Primary | ICD-10-CM

## 2022-01-03 NOTE — TELEPHONE ENCOUNTER
1-3-22  Reason for Call:  Medication     Do you use a [facility] [place]     Name of the medication requested: metroNIDAZOLE (METROGEL) 0.75 % vaginal gel     Other request: none    Can we leave a detailed message on this number? YES    Phone number patient can be reached at: Cell number on file:    Telephone Information:   Mobile [phone]       Best Time: anytime    Call taken on 1/3/2022 at 10:17 AM by Ines Vega

## 2022-01-03 NOTE — TELEPHONE ENCOUNTER
Patient called back I relayed message below, Gladys was set as primary but patient had no idea why she hasn't seen her before. So, patient said that the last person who refilled this, from what I see possibly Chuck Matos[facility]? Per pt was told that there would be a refill on file for the next time, because patient [place] and [place] away, there are 0 refills. I am just relaying message, please call patient again if no further refill can be done without wet prep test.     Trish Berman

## 2022-01-03 NOTE — TELEPHONE ENCOUNTER
I called and LVMTCB, if/when patient calls back please inform her of covering providers documentation.

## 2022-01-03 NOTE — TELEPHONE ENCOUNTER
I am not going to simply refill of medication that is used for bacterial vaginosis without a wet prep test.

## 2022-01-05 RX ORDER — METRONIDAZOLE 7.5 MG/G
1 GEL VAGINAL DAILY
Qty: 25 G | Refills: 1 | Status: SHIPPED | OUTPATIENT
Start: 2022-01-05 | End: 2022-01-10

## 2022-01-05 NOTE — TELEPHONE ENCOUNTER
I sent in a refill of the metronidazole. But please let her know that in the future we will need to see her in the clinic for a wet prep

## 2022-01-05 NOTE — TELEPHONE ENCOUNTER
Called and informed patient. Patient states she will set up an appointment as she is also due for a PAP.

## 2022-01-08 ENCOUNTER — HEALTH MAINTENANCE LETTER (OUTPATIENT)
Age: 37
End: 2022-01-08

## 2022-08-15 DIAGNOSIS — N76.0 BV (BACTERIAL VAGINOSIS): Primary | ICD-10-CM

## 2022-08-15 DIAGNOSIS — B96.89 BV (BACTERIAL VAGINOSIS): Primary | ICD-10-CM

## 2022-08-15 NOTE — TELEPHONE ENCOUNTER
New Medication Request        What medication are you requesting?: Metrogel    Reason for medication request: Vaginal Odor    Have you taken this medication before?: Yes: patient states she gets refill every 6 months, although she does not have a primary and has not been seen as of recently    Controlled Substance Agreement on file:   CSA -- Patient Level:    CSA: None found at the patient level.         Patient offered an appointment? Yes: declined stating she wont pay to be seen just to get metrogel. Also states she does not have access to [place].    Preferred Pharmacy:   [facility] [address] [place], [place] [address]  Phone: [phone] Fax: [phone]      Could we send this information to you in [place] or would you prefer to receive a phone call?:   Patient would prefer a phone call   Okay to leave a detailed message?: Yes at Cell number on file:    Telephone Information:   Mobile [phone]

## 2022-08-16 RX ORDER — METRONIDAZOLE 7.5 MG/G
1 GEL VAGINAL DAILY
Qty: 70 G | Refills: 0 | Status: SHIPPED | OUTPATIENT
Start: 2022-08-16 | End: 2022-08-21

## 2022-08-16 NOTE — TELEPHONE ENCOUNTER
Patient has not been seen in over 1 year.  She will need an office visit.  If she feels like she has a current vaginal infection, I recommend submitting an E-visit so we can order some testing.    Gladys Mccurdy NP

## 2022-10-13 ENCOUNTER — OFFICE VISIT (OUTPATIENT)
Dept: FAMILY MEDICINE | Facility: CLINIC | Age: 37
End: 2022-10-13
Payer: COMMERCIAL

## 2022-10-13 VITALS
DIASTOLIC BLOOD PRESSURE: 68 MMHG | BODY MASS INDEX: 24.54 KG/M2 | SYSTOLIC BLOOD PRESSURE: 104 MMHG | HEIGHT: 67 IN | HEART RATE: 80 BPM | WEIGHT: 156.38 LBS

## 2022-10-13 DIAGNOSIS — Z00.00 ROUTINE GENERAL MEDICAL EXAMINATION AT A HEALTH CARE FACILITY: Primary | ICD-10-CM

## 2022-10-13 DIAGNOSIS — Z13.1 SCREENING FOR DIABETES MELLITUS: ICD-10-CM

## 2022-10-13 DIAGNOSIS — Z12.4 CERVICAL CANCER SCREENING: ICD-10-CM

## 2022-10-13 DIAGNOSIS — Z13.220 SCREENING FOR LIPID DISORDERS: ICD-10-CM

## 2022-10-13 PROCEDURE — 87624 HPV HI-RISK TYP POOLED RSLT: CPT | Performed by: NURSE PRACTITIONER

## 2022-10-13 PROCEDURE — 99395 PREV VISIT EST AGE 18-39: CPT | Performed by: NURSE PRACTITIONER

## 2022-10-13 PROCEDURE — G0145 SCR C/V CYTO,THINLAYER,RESCR: HCPCS | Performed by: NURSE PRACTITIONER

## 2022-10-13 RX ORDER — CETIRIZINE HYDROCHLORIDE 10 MG/1
10 TABLET ORAL DAILY
COMMUNITY

## 2022-10-13 ASSESSMENT — ENCOUNTER SYMPTOMS
CHILLS: 0
CONSTIPATION: 0
NERVOUS/ANXIOUS: 1
DYSURIA: 0
FREQUENCY: 0
MYALGIAS: 0
JOINT SWELLING: 0
ABDOMINAL PAIN: 0
WEAKNESS: 0
ARTHRALGIAS: 0
DIARRHEA: 0
EYE PAIN: 0
DIZZINESS: 0
HEARTBURN: 0
NAUSEA: 0
HEMATURIA: 0
PALPITATIONS: 0
SORE THROAT: 0
SHORTNESS OF BREATH: 0
FEVER: 0
BREAST MASS: 0
HEMATOCHEZIA: 0
HEADACHES: 0
PARESTHESIAS: 0
COUGH: 0

## 2022-10-13 NOTE — PROGRESS NOTES
SUBJECTIVE:   CC: Carole is an 37 year old who presents for preventive health visit.     Patient is recently .  She has a 5-year-old son.  Recently bought a house in [place] and works as an  for [employer].    Patient has a Nexplanon in place.  She gets some irregular bleeding with this.  She is due for Pap.  No history of abnormal.    Patient smokes marijuana on a daily basis.  She uses this to help with her anxiety.  Denies any chronic GI symptoms.    History of seasonal allergies.  She uses an antihistamine for this.    History of recurrent bacterial vaginosis.  She tends to get this after sexual intercourse.  Her symptoms include vaginal odor and sometimes discharge.  She has responded well to vaginal MetroGel well in the past.  She currently denies any symptoms.    Patient is adopted and does not know any of her family medical history.    Patient declines any immunizations today.    Patient has been advised of split billing requirements and indicates understanding: Yes  Healthy Habits:     Getting at least 3 servings of Calcium per day:  Yes    Bi-annual eye exam:  Yes    Dental care twice a year:  Yes    Sleep apnea or symptoms of sleep apnea:  None    Diet:  Regular (no restrictions)    Frequency of exercise:  None    Taking medications regularly:  Yes    Medication side effects:  Not applicable    PHQ-2 Total Score: 0    Additional concerns today:  No      Today's PHQ-2 Score:   PHQ-2 ( 1999 Pfizer) 10/13/2022   Q1: Little interest or pleasure in doing things 0   Q2: Feeling down, depressed or hopeless 0   PHQ-2 Score 0   Q1: Little interest or pleasure in doing things Not at all   Q2: Feeling down, depressed or hopeless Not at all   PHQ-2 Score 0       Abuse: Current or Past (Physical, Sexual or Emotional) - No  Do you feel safe in your environment? Yes    Have you ever done Advance Care Planning? (For example, a Health Directive, POLST, or a discussion with a medical provider or  your loved ones about your wishes): No, advance care planning information given to patient to review.  Patient plans to discuss their wishes with loved ones or provider.      Social History     Tobacco Use     Smoking status: Former     Packs/day: 0.50     Types: Cigarettes     Quit date: 8/10/2015     Years since quittin.1     Smokeless tobacco: Former   Substance Use Topics     Alcohol use: No     Alcohol/week: 0.0 standard drinks     Comment: twice per month     If you drink alcohol do you typically have >3 drinks per day or >7 drinks per week? No    Alcohol Use 10/13/2022   Prescreen: >3 drinks/day or >7 drinks/week? No   Prescreen: >3 drinks/day or >7 drinks/week? -       Reviewed orders with patient.  Reviewed health maintenance and updated orders accordingly - Yes      Breast Cancer Screening:    Pertinent mammograms are reviewed under the imaging tab.    History of abnormal Pap smear: NO - age 30-65 PAP every 5 years with negative HPV co-testing recommended  PAP / HPV Latest Ref Rng & Units 2016   PAP (Historical) - NIL NIL   HPV16 NEG Negative -   HPV18 NEG Negative -   HPV - See Scanned Report -   HRHPV NEG Negative -     Reviewed and updated as needed this visit by clinical staff   Tobacco  Allergies  Meds  Problems  Med Hx  Surg Hx  Fam Hx          Reviewed and updated as needed this visit by Provider   Tobacco  Allergies  Meds  Problems  Med Hx  Surg Hx  Fam Hx             Review of Systems   Constitutional: Negative for chills and fever.   HENT: Positive for congestion. Negative for ear pain, hearing loss and sore throat.    Eyes: Negative for pain and visual disturbance.   Respiratory: Negative for cough and shortness of breath.    Cardiovascular: Negative for chest pain, palpitations and peripheral edema.   Gastrointestinal: Negative for abdominal pain, constipation, diarrhea, heartburn, hematochezia and nausea.   Breasts:  Negative for tenderness, breast mass and  "discharge.   Genitourinary: Negative for dysuria, frequency, genital sores, hematuria, pelvic pain, urgency, vaginal bleeding and vaginal discharge.   Musculoskeletal: Negative for arthralgias, joint swelling and myalgias.   Skin: Negative for rash.   Neurological: Negative for dizziness, weakness, headaches and paresthesias.   Psychiatric/Behavioral: Positive for mood changes. The patient is nervous/anxious.           OBJECTIVE:   /68   Pulse 80   Ht 1.689 m (5' 6.5\")   Wt 70.9 kg (156 lb 6 oz)   BMI 24.86 kg/m    Physical Exam  GENERAL: healthy, alert and no distress  EYES: Eyes grossly normal to inspection, PERRL and conjunctivae and sclerae normal  HENT: ear canals and TM's normal, nose and mouth without ulcers or lesions  NECK: no adenopathy, no asymmetry, masses, or scars and thyroid normal to palpation  RESP: lungs clear to auscultation - no rales, rhonchi or wheezes  BREAST: normal without masses, tenderness or nipple discharge and no palpable axillary masses or adenopathy. Bilateral implants  CV: regular rate and rhythm, normal S1 S2, no S3 or S4, no murmur, click or rub, no peripheral edema  ABDOMEN: soft, nontender, no hepatosplenomegaly, no masses and bowel sounds normal   (female): normal female external genitalia, normal urethral meatus, vaginal mucosa pink, moist, well rugated, and normal cervix/adnexa/uterus without masses or discharge  MS: no gross musculoskeletal defects noted, no edema  SKIN: no suspicious lesions or rashes  NEURO: Normal strength and tone, mentation intact and speech normal  PSYCH: mentation appears normal, affect normal/bright      ASSESSMENT/PLAN:   Carole was seen today for physical.    Diagnoses and all orders for this visit:    Routine general medical examination at a health care facility    Cervical cancer screening  -     Pap Screen with HPV - recommended age 30 - 65 years    Screening for diabetes mellitus  -     Basic metabolic panel; Future    Screening for " "lipid disorders  -     Lipid panel reflex to direct LDL Fasting; Future    Other orders  -     REVIEW OF HEALTH MAINTENANCE PROTOCOL ORDERS        Patient has been advised of split billing requirements and indicates understanding: Yes    COUNSELING:  Reviewed preventive health counseling, as reflected in patient instructions    Estimated body mass index is 24.86 kg/m  as calculated from the following:    Height as of this encounter: 1.689 m (5' 6.5\").    Weight as of this encounter: 70.9 kg (156 lb 6 oz).        She reports that she quit smoking about 7 years ago. Her smoking use included cigarettes. She smoked an average of .5 packs per day. She has quit using smokeless tobacco.        Gladys Mccurdy NP  [facility]  "

## 2022-10-19 LAB
BKR LAB AP GYN ADEQUACY: NORMAL
BKR LAB AP GYN INTERPRETATION: NORMAL
BKR LAB AP HPV REFLEX: NORMAL
BKR LAB AP PREVIOUS ABNORMAL: NORMAL
PATH REPORT.COMMENTS IMP SPEC: NORMAL
PATH REPORT.COMMENTS IMP SPEC: NORMAL
PATH REPORT.RELEVANT HX SPEC: NORMAL

## 2022-10-21 LAB
HUMAN PAPILLOMA VIRUS 16 DNA: NEGATIVE
HUMAN PAPILLOMA VIRUS 18 DNA: NEGATIVE
HUMAN PAPILLOMA VIRUS FINAL DIAGNOSIS: NORMAL
HUMAN PAPILLOMA VIRUS OTHER HR: NEGATIVE

## 2022-11-20 ENCOUNTER — HEALTH MAINTENANCE LETTER (OUTPATIENT)
Age: 37
End: 2022-11-20

## 2022-12-02 ENCOUNTER — NURSE TRIAGE (OUTPATIENT)
Dept: FAMILY MEDICINE | Facility: CLINIC | Age: 37
End: 2022-12-02

## 2022-12-02 RX ORDER — METRONIDAZOLE 7.5 MG/G
1 GEL VAGINAL PRN
Qty: 5 G | Refills: 3 | Status: CANCELLED | OUTPATIENT
Start: 2022-12-02

## 2022-12-02 RX ORDER — METRONIDAZOLE 7.5 MG/G
1 GEL VAGINAL PRN
COMMUNITY
End: 2023-05-19

## 2022-12-03 ENCOUNTER — MYC REFILL (OUTPATIENT)
Dept: FAMILY MEDICINE | Facility: CLINIC | Age: 37
End: 2022-12-03

## 2022-12-03 ENCOUNTER — E-VISIT (OUTPATIENT)
Dept: URGENT CARE | Facility: CLINIC | Age: 37
End: 2022-12-03
Payer: COMMERCIAL

## 2022-12-03 DIAGNOSIS — N89.8 VAGINAL DISCHARGE: Primary | ICD-10-CM

## 2022-12-03 DIAGNOSIS — B96.89 BACTERIAL VAGINOSIS: Primary | ICD-10-CM

## 2022-12-03 DIAGNOSIS — N76.0 BACTERIAL VAGINOSIS: Primary | ICD-10-CM

## 2022-12-03 PROCEDURE — 99421 OL DIG E/M SVC 5-10 MIN: CPT | Performed by: PHYSICIAN ASSISTANT

## 2022-12-03 RX ORDER — METRONIDAZOLE 500 MG/1
500 TABLET ORAL 2 TIMES DAILY
Qty: 14 TABLET | Refills: 0 | Status: SHIPPED | OUTPATIENT
Start: 2022-12-03 | End: 2022-12-10

## 2022-12-03 RX ORDER — METRONIDAZOLE 7.5 MG/G
1 GEL VAGINAL PRN
Status: CANCELLED | OUTPATIENT
Start: 2022-12-03

## 2022-12-03 RX ORDER — METRONIDAZOLE 7.5 MG/G
1 GEL VAGINAL AT BEDTIME
Qty: 70 G | Refills: 0 | Status: SHIPPED | OUTPATIENT
Start: 2022-12-03 | End: 2022-12-05

## 2022-12-03 NOTE — PATIENT INSTRUCTIONS
Bacterial Vaginosis    You have a vaginal infection called bacterial vaginosis (BV). Both good and bad bacteria are present in a healthy vagina. BV occurs when these bacteria get out of balance. The number of bad bacteria increase. And the number of good bacteria decrease. BV is linked with sexual activity, but it's not a sexually transmitted infection (STI).   BV may or may not cause symptoms. If symptoms do occur, they can include:     Thin, gray, milky-white, or sometimes green discharge    Unpleasant odor or  fishy  smell    Itching, burning, or pain in or around the vagina  It is not known what causes BV, but certain factors can make the problem more likely. These can include:     Douching    Spermicides    Use of antibiotics    Change in hormone levels with pregnancy, breastfeeding, or menopause    Having sex with a new partner    Having sex with more than one partner  BV will sometimes go away on its own. But treatment is often advised. This is because untreated BV can raise the risk of more serious health problems such as:     Pelvic inflammatory disease (PID)     delivery (giving birth to a baby early if you re pregnant)    HIV and some other sexually transmitted infections (STIs)    Infection after surgery on the reproductive organs  Home care  General care    BV is most often treated with medicines called antibiotics. These may be given as pills or as a vaginal cream. If antibiotics are prescribed, be sure to use them exactly as directed. And complete all of the medicine, even if your symptoms go away.    Don't douche or having sex during treatment.    If you have sex with a female partner, ask your healthcare provider if she should also be treated.  Prevention    Don't douche.    Don't have sex. If you do have sex, then take steps to lower your risk:  ? Use condoms when having sex.  ? Limit the number of sex partners you have.    Follow-up care  Follow up with your healthcare provider, or as  advised.   When to get medical advice  Call your healthcare provider right away if:     You have a fever of 100.4 F (38 C) or higher, or as directed by your provider.    Your symptoms get worse, or they don t go away within a few days of starting treatment.    You have new pain in the lower belly or pelvic region.    You have side effects that bother you or a reaction to the pills or cream you re prescribed.    You or any of your sex partners have new symptoms, such as a rash, joint pain, or sores.  [employer] last reviewed this educational content on 6/1/2020 2000-2021 [employer]. All rights reserved. This information is not intended as a substitute for professional medical care. Always follow your healthcare professional's instructions.

## 2022-12-03 NOTE — TELEPHONE ENCOUNTER
Pt is calling.    See nurse triage note as well as E-Visit from today, 12/03/2022.    Did an E-Visit today. Wrong medication was sent in. Pt is very upset, teary, getting angry, as she has been on the phone all day.   Bacterial Vaginosis. Pills were sent in, and NOT the gel.  Pt requesting change to the MetroGel, not oral pills, as she stated that she never responds to the oral medication.    I advised her that I would need to page the on call provider, to see if OK to send in the Gel, and get that script going, and then will send message to her PCP, as patient stated that she gets these frequently and with intercourse, and would like to have refills attached, and it added to her routine medication list.    Plan:  Will page on call and then call her back.    MD consult, Dr. Huma De Jesus Paged by RN at 1421    Reason for page:  Needing Metrogel called in, and NOT Oral Flagyl.    No response from Dr. De Jesus within the 10 minute paging guideline.     Call to answering service at 1431 as Dr De Jesus has been paged 3 times now in the last hour regarding this patient, with no answer. Spoke to Bettina, and she stated to page the Internal Med on call.  When advising to contact the back up, she stated that they do not do that.    Page to Dr Hodgson, on call for internal medicine.     Provider, Dr. Hodgson, returning page to Nurse Advisors at 2:35 PM  She stated that she does not cover for Family Practice and to contact answering service to get back up or medical director.    1441-Call back to answering service. They stated that they would try to reach Dr De Jesus on her phone. Went directly to [employer].  Dr Luis Fonseca is back up provider. Call to his cell via answering service, and no answer.  1446-Call to Dr Portillo via answering service. He was able to be reached.  He stated that it is ok to go ahead and send in RX for the Metro Gel Vaginal gel.  RX sent.  1500-Call back to patient. I advised her that her script  was sent for the gel. We will send a message over to Gladys Mccurdy NP to see if ok to put on her med list and if ok to add refills.  She verbalized understanding and was very grateful.      Mary Sykes RN  [facility] Nurse Advisor  12/3/2022 at 3:02 PM

## 2022-12-03 NOTE — TELEPHONE ENCOUNTER
Nurse Triage SBAR    Placed follow up call to patient. Encouraged to complete My Chart E-Visit.  Patient verbalized understanding and will complete visit now.    Situation:     Patient is requesting metro gel refill for today.    Background:     Reporting history of bacterial vaginosis     Per office notes from 10/13/22.   History of recurrent bacterial vaginosis.  She tends to get this after sexual intercourse.  Her symptoms include vaginal odor and sometimes discharge.  She has responded well to vaginal MetroGel well in the past.      Assessment:     Patient reporting symptoms starting in past 2 days with vaginal discharge and odor.  Stating symptoms are similar to previous bacterial vaginosis symptoms.  Patient is in a public area and is requesting not to provide further information.  Denies any further questions to triage. Requesting refill today.    Patient stating she made request during clinic hours yesterday.    Recommendation:     1145 a.m. Paged on call Dr De Jesus through [employer] to call Serena at  [phone].   1202 re paged on call Dr De Jesus through [employer] to call Serena at  [phone].  Paged to provider  No provider response. Placed call to patient encouraged e-visit at this time.    Reason for Disposition    [1] Prescription refill request for NON-ESSENTIAL medicine (i.e., no harm to patient if med not taken) AND [2] triager unable to refill per department policy    Additional Information    Negative: New-onset or worsening symptoms, see that guideline (e.g., diarrhea, runny nose, sore throat)    Negative: Medicine question not related to refill or renewal    Negative: Caller (e.g., patient or pharmacist) requesting information about a new medicine    Negative: Caller requesting information unrelated to medicine    Negative: [1] Prescription refill request for ESSENTIAL medicine (i.e., likelihood of harm to patient if not taken) AND [2] triager unable to refill per  department policy    Negative: [1] Prescription not at pharmacy AND [2] was prescribed by PCP recently  (Exception: triager has access to EMR and prescription is recorded there. Go to Home Care and confirm for pharmacy.)    Negative: [1] Pharmacy calling with prescription questions AND [2] triager unable to answer question    Negative: Prescription request for new medicine (not a refill)    Negative: Caller requesting a CONTROLLED substance prescription refill (e.g., narcotics, ADHD medicines)    Protocols used: MEDICATION REFILL AND RENEWAL CALL-A-AH

## 2022-12-05 RX ORDER — METRONIDAZOLE 7.5 MG/G
1 GEL VAGINAL AT BEDTIME
Qty: 70 G | Refills: 3 | Status: SHIPPED | OUTPATIENT
Start: 2022-12-05 | End: 2022-12-10

## 2022-12-05 NOTE — TELEPHONE ENCOUNTER
Called patient - No answer. Left message notifying of prescription being sent in.     Che Hanson RN, BSN  [facility]

## 2023-04-18 NOTE — TELEPHONE ENCOUNTER
I refilled this with refills available.  Please let the patient know.    Gladys Mccurdy NP     [FreeTextEntry1] :  42-year-old female with longstanding history of prolapsing and bleeding internal and external hemorrhoids.  Patient has been taking care with multiple rubber band treatments with Dr. Greyson Casey.  Has not had much of an improvement.  Underwent previous rubber band ligation procedure with good results

## 2023-09-13 ENCOUNTER — PATIENT OUTREACH (OUTPATIENT)
Dept: CARE COORDINATION | Facility: CLINIC | Age: 38
End: 2023-09-13
Payer: COMMERCIAL

## 2023-09-27 ENCOUNTER — PATIENT OUTREACH (OUTPATIENT)
Dept: CARE COORDINATION | Facility: CLINIC | Age: 38
End: 2023-09-27
Payer: COMMERCIAL

## 2023-11-10 DIAGNOSIS — N76.0 BACTERIAL VAGINOSIS: Primary | ICD-10-CM

## 2023-11-10 DIAGNOSIS — B96.89 BACTERIAL VAGINOSIS: Primary | ICD-10-CM

## 2023-11-10 RX ORDER — METRONIDAZOLE 7.5 MG/G
GEL VAGINAL DAILY
COMMUNITY
End: 2023-11-10

## 2023-11-10 RX ORDER — METRONIDAZOLE 7.5 MG/G
GEL VAGINAL DAILY
Qty: 70 G | Refills: 0 | Status: SHIPPED | OUTPATIENT
Start: 2023-11-10 | End: 2024-05-27

## 2023-11-10 NOTE — TELEPHONE ENCOUNTER
Medication Question or Refill    Contacts         Type Contact Phone/Fax    11/10/2023 09:47 AM CST Phone (Incoming) Carole Hernández (Self) [phone] (H)            What medication are you calling about (include dose and sig)?:       Preferred Pharmacy:  [id number] [place] [id number] AT [facility] [address]  [address]  [facility] [address]  Phone: [phone] Fax: [phone]      Controlled Substance Agreement on file:   CSA -- Patient Level:    CSA: None found at the patient level.       Who prescribed the medication?: PCP    Do you need a refill? Yes    Do you have any questions or concerns?  No    Could we send this information to you in [facility] or would you prefer to receive a phone call?:   Patient would prefer a phone call     Okay to leave a detailed message?: Yes at Cell number on file:    Telephone Information:   Mobile [phone]

## 2023-11-15 ENCOUNTER — TELEPHONE (OUTPATIENT)
Dept: FAMILY MEDICINE | Facility: CLINIC | Age: 38
End: 2023-11-15
Payer: COMMERCIAL

## 2023-11-15 DIAGNOSIS — N76.0 BACTERIAL VAGINOSIS: ICD-10-CM

## 2023-11-15 DIAGNOSIS — B96.89 BACTERIAL VAGINOSIS: ICD-10-CM

## 2023-11-15 NOTE — TELEPHONE ENCOUNTER
11-15-23  FYI - Status Update    Who is Calling: wendy     Update: wendy called with questions on :  metroNIDAZOLE (METROGEL) 0.75 % vaginal gel   The directions are not clear    Does caller want a call/response back: Yes     Could we send this information to you in [place] or would you prefer to receive a phone call?:   Patient would prefer a phone call   Okay to leave a detailed message?: No at Other phone number:  [phone]

## 2023-11-15 NOTE — TELEPHONE ENCOUNTER
"Outgoing call to [facility] pharmacy, after sitting on hold for 30 minutes, they hung up on writer.     Called back and left VM stating that I am a RN at [facility] calling on Callie Betty  85 returning their call on the metronidazole 0.75% vaginal gel that Gladys Mccurdy NPI [id number] prescribed on 11/10/23. They called wanting clarification on directions. Writer assumes on \"Place vaginally daily\" Writer states that directions should read \"Place 1 applicator vaginally At Bedtime for 5 days\" if they have further questions, they are to call the clinic back at [phone]  "

## 2023-11-25 ENCOUNTER — HEALTH MAINTENANCE LETTER (OUTPATIENT)
Age: 38
End: 2023-11-25

## 2024-02-06 ENCOUNTER — NURSE TRIAGE (OUTPATIENT)
Dept: FAMILY MEDICINE | Facility: CLINIC | Age: 39
End: 2024-02-06
Payer: COMMERCIAL

## 2024-02-06 NOTE — TELEPHONE ENCOUNTER
"  Is this a 2nd Level Triage? NO    Situation:  Pt requesting someone just call her in a script for Tamiflu- RN stated pt would need to come in and have lab test done to confirm. Pt states she is unable to come in for apt. RN offered patient a few options for an inperson apt .    Background: Pt requesting Tamiflu- but due to having a small child -does not want to come in to clinic for testing.     Assessment: .   1. TYPE of EXPOSURE: \"How were you exposed?\" (e.g., close contact, not a close contact)      Exposed to her dad over the weekend  2. DATE of EXPOSURE: \"When did the exposure occur?\" (e.g., hour, days, weeks)      weekend  5. SYMPTOMS: \"Do you have any symptoms?\" (e.g., cough, fever, sore throat, difficulty breathing).      Achey- does not feel well-pt  Dad tested + for influenza yesterdayt    Recommendation: Pt will try home care tips - RN encouraged clear liquids, tylenol, motrin for comfort, or she can try to make a e Visit for flu- but told patient provider doing E visit still may recommend patient come in to be swabbed in order to obtain Tamiflu.   Pt appreciative of information.      Protocol Recommended Disposition:   Home Care      Does the patient meet one of the following criteria for ADS visit consideration? No    Reason for Disposition   Influenza EXPOSURE (Close Contact) within last 7 days and NO respiratory symptoms    Additional Information   Negative: Patient wants to be seen   Negative: Fever present > 3 days (72 hours)   Negative: Influenza EXPOSURE (Close Contact) within last 48 hours (2 days) and NOT HIGH RISK and strongly requests antiviral medication   Negative: Influenza EXPOSURE within last 48 hours (2 days) and exposed person is a health care worker, public health worker, or  (EMS)   Negative: Influenza EXPOSURE within last 48 hours (2 days) and exposed person is HIGH RISK (e.g., age > 64 years, pregnant, HIV+, chronic medical condition)   Negative: Patient sounds very " sick or weak to the triager   Negative: Sore throat and begins > 7 days after influenza EXPOSURE   Negative: Influenza EXPOSURE (close contact) within the last 7 days and fever or any respiratory symptoms (i.e., cough, runny or stuffy nose, sore throat)   Negative: Runny or stuffy nose and begins > 7 days after influenza EXPOSURE   Negative: Influenza suspected (i.e., fever and respiratory symptoms; probable influenza exposure)   Negative: Cough and begins > 7 days after influenza EXPOSURE   Negative: Influenza has been diagnosed by a doctor (or NP/PA)    Protocols used: Influenza (Flu) Exposure-A-OH    Marcela Mcclellan, SARAHN, RN, PHN, PED-BC, CPEN  [facility]  02/06/24

## 2024-05-09 ENCOUNTER — OFFICE VISIT (OUTPATIENT)
Dept: MIDWIFE SERVICES | Facility: CLINIC | Age: 39
End: 2024-05-09
Payer: COMMERCIAL

## 2024-05-09 VITALS
BODY MASS INDEX: 27.36 KG/M2 | HEART RATE: 93 BPM | HEIGHT: 65 IN | SYSTOLIC BLOOD PRESSURE: 110 MMHG | DIASTOLIC BLOOD PRESSURE: 60 MMHG | WEIGHT: 164.2 LBS

## 2024-05-09 DIAGNOSIS — Z30.46 SURVEILLANCE OF PREVIOUSLY PRESCRIBED IMPLANTABLE SUBDERMAL CONTRACEPTIVE: Primary | ICD-10-CM

## 2024-05-09 PROCEDURE — 11983 REMOVE/INSERT DRUG IMPLANT: CPT | Performed by: ADVANCED PRACTICE MIDWIFE

## 2024-05-09 NOTE — PATIENT INSTRUCTIONS
NEXPLANON AFTERCARE INSTRUCTIONS   Keep dressing on and dry for 24 hours, then remove wrap.  Replace bandaid daily for 5 days. Keep your user card in a safe place where you'll remember it.    You may have some pain at the site of the Nexplanon insertion. You can help relieve the discomfort with Tylenol (acetaminophen), Aspirin or Advil (ibuprofen). If your discomfort worsens or you notice redness spreading on the skin around the insertion site, please call the clinic.     Irregular bleeding is common with Nexplanon, especially in the first 6-12 months of use. After one year, approximately 20% of women who use Nexplanon will stop having periods completely. Some women have longer, heavier periods. Some women will have increased spotting between periods. You may find that your periods may be hard to predict.     The Nexplanon does not protect against sexually transmitted infections including the AIDS virus (HIV), warts (HPV), gonorrhea, Chlamydia, and herpes. Condoms should be used to decrease the risk sexually transmitted infections. If you think that you have been exposed to a sexually transmitted infection, please call the clinic.     If you had Nexplanon placed for birth control, it is effective immediately if it was inserted within five days after the start of your period. If you have Nexplanon inserted at any other time during your menstrual cycle, use another method of birth control, like condoms for at least 7 days.     The Nexplanon should be removed and/or replaced by a health care provider after three years.   Warning Signs   Call the clinic if any of the following occurs:    You have bleeding, pus, or increasing redness, or pain at insertion site.    You have fever or chills    The implant comes out or you have concerns about its location.    You have a positive pregnancy test or suspect you might be pregnant.     Nutritional Supplements for Treatment   Take 4 probiotic capsules daily for 2 days, then 2  capsules daily for 5 days. The probiotic should contain Lactobacillus species, and if you have a vaginal infection as well, make sure it also contains Lactobacillus reuteri and rhamnosus   Take 1000 mg. vitamin C every 4 hours for 2 days, then 500 mg. every 4 hours for 5 additional days    Lactobacillus Acidophilus Oral capsule    What is this medicine?  LACTOBACILLUS (camille watts) is a supplement. It is used to help the normal balance of bacteria in the colon. This may treat or prevent diarrhea caused by an infection or by antibiotics. The FDA has not approved this supplement for any medical use.  This supplement may be used for other purposes; ask your health care provider or pharmacist if you have questions.  What should I tell my health care provider before I take this medicine?  They need to know if you have any of these conditions:  chronic disease  immune system problems  prosthetic heart valve or valvular heart disease  an unusual or allergic reaction to Lactobacillus, any medicines, lactose or milk, other foods, dyes, or preservatives  pregnant or trying to get pregnant  breast-feeding  How should I use this medicine?  Take this medicine by mouth with a small amount of milk, fruit juice, or water. Follow the directions on the package labeling, or take as directed by your health care professional. This medicine can be taken with cereal or other food. Do not take this medicine more often than directed.  Contact your pediatrician regarding the use of this medicine in children. Special care may be needed. This medicine is not recommended for children under 3 years old unless prescribed by a doctor.  Overdosage: If you think you have taken too much of this medicine contact a poison control center or emergency room at once.  NOTE: This medicine is only for you. Do not share this medicine with others.  What if I miss a dose?  If you miss a dose, take it as soon as you can. If it is almost time for your  next dose, take only that dose. Do not take double or extra doses.  What may interact with this medicine?  Interactions are not expected.  This list may not describe all possible interactions. Give your health care provider a list of all the medicines, herbs, non-prescription drugs, or dietary supplements you use. Also tell them if you smoke, drink alcohol, or use illegal drugs. Some items may interact with your medicine.  What should I watch for while using this medicine?  See your doctor if your symptoms do not get better or if they get worse. Do not take this supplement for more than 2 days or if you have a fever unless your doctor tells you to.  If you have allergies to milk or you are sensitive to lactose, do not use this supplement.  What side effects may I notice from receiving this medicine?  Side effects that you should report to your doctor or health care professional as soon as possible:  allergic reactions like skin rash, itching or hives, swelling of the face, lips, or tongue  breathing problems  severe nausea, vomiting  unusually weak or tired  Side effects that usually do not require medical attention (report to your doctor or health care professional if they continue or are bothersome):  hiccups  stomach gas  This list may not describe all possible side effects. Call your doctor for medical advice about side effects. You may report side effects to FDA at 2-517-FDA-1780.  Where should I keep my medicine?  Keep out of the reach of children.  Store in the refrigerator or as directed on the package label. Do not freeze. Throw away any unused medicine after the expiration date.  NOTE:This sheet is a summary. It may not cover all possible information. If you have questions about this medicine, talk to your doctor, pharmacist, or health care provider. Copyright  2015 Gold Standard

## 2024-05-09 NOTE — PROGRESS NOTES
"NEXPLANON REMOVAL/REINSERTION:    Is a pregnancy test required: No.  Was a consent obtained?  Yes    Subjective: Carole Hernández is a 38 year old  presents for Nexplanon.    Patient has been given the opportunity to ask questions about all forms of birth control, including all options appropriate for Carole Hernández. Discussed that no method of birth control, except abstinence is 100% effective against pregnancy or sexually transmitted infection.     Currently with a monogamous relationship for 1 year. Notes BV after IC. Notes slight odor    Carole Hernández understands planning removal and reinsertion today    The entire removal and insertion procedure was reviewed with the patient, including care after placement.      /60 (BP Location: Right arm, Patient Position: Sitting, Cuff Size: Adult Regular)   Pulse 93   Ht 1.651 m (5' 5\")   Wt 74.5 kg (164 lb 3.2 oz)   LMP  (LMP Unknown)   Breastfeeding No   BMI 27.32 kg/m      PROCEDURE NOTE: -- Nexplanon Removal/Insertion    Technique: On the left arm  Skin prep Betadine  Anesthesia 1% lidocaine  Procedure: Patient was placed supine with left arm exposed.  Implant located by palpitation. Oscar was made 8-10 cm above medial epicondyle and a guiding oscar 4 cm above the first.  Arm was prepped with Betadine. Incision point was anesthetized with 2 mL 1% lidocaine.     Small incision (<5mm) was made at distal end of palpable implant, curved hemostat or mosquito forceps was used to isolate the implant and bring it to the incision, the fibrous capsule containing the implant  was incised and the implant was removed intact.    After stretching the skin with thumb and index finger around the insertion site, skin punctured with the tip of the needle inserted at 30 degrees and then lowered to horizontal position. While lifting the skin with the tip of the needle, inserted the needle to its full length. Applicator was then stabilized and the slider was unlocked by " pushing it slightly down. Slider moved back completely until it stopped. Applicator was then removed.    Correct placement of the implant was confirmed by palpation in the patient's arm and visualizing the purple top of the obturator.   Bandage and pressure dressing applied to insertion site.    Lot # [id number]  Exp:     EBL: minimal    Complications: none    ASSESSMENT:     ICD-10-CM    1. Surveillance of previously prescribed implantable subdermal contraceptive  Z30.46 etonogestrel (NEXPLANON) subdermal implant 68 mg     REMOVAL AND REINSERTION NEXPLANON           PLAN:    Given 's handouts, including when to have Nexplanon removed, list of danger s/sx, side effects and follow up recommended. Encouraged condom use for prevention of STD. Back up contraception advised for 7 days. Advised to call for any fever, for prolonged or severe pain or bleeding, abnormal vaginal dischage. She was advised to use pain medications (ibuprofen) as needed for mild to moderate pain.     Carol Guzman CNM

## 2024-05-27 ENCOUNTER — MYC REFILL (OUTPATIENT)
Dept: FAMILY MEDICINE | Facility: CLINIC | Age: 39
End: 2024-05-27
Payer: COMMERCIAL

## 2024-05-27 DIAGNOSIS — N76.0 BACTERIAL VAGINOSIS: ICD-10-CM

## 2024-05-27 DIAGNOSIS — B96.89 BACTERIAL VAGINOSIS: ICD-10-CM

## 2024-05-28 RX ORDER — METRONIDAZOLE 7.5 MG/G
GEL VAGINAL DAILY
Qty: 70 G | Refills: 0 | Status: SHIPPED | OUTPATIENT
Start: 2024-05-28

## 2024-12-03 ENCOUNTER — NURSE TRIAGE (OUTPATIENT)
Dept: FAMILY MEDICINE | Facility: CLINIC | Age: 39
End: 2024-12-03
Payer: COMMERCIAL

## 2024-12-03 NOTE — TELEPHONE ENCOUNTER
Nurse Triage SBAR    Is this a 2nd Level Triage? NO    Situation: Pt requesting an appointment.     Background: Pt states for the past week, she has had an ingrown hair at the bikini line.     Assessment: Pt states for the past week, she has an ingrown hair at the bikini line. She has been applying antibiotic to the area. Today, pt decided to stick the area with a pin and blood drained from the area. Pt states the area is more tender and swollen. No fever, red streaks.     Protocol Recommended Disposition:   See in Office Today or Tomorrow    Recommendation: Discussed going to urgent care today. Pt requested OV next day with PCP. Visit scheduled. Pt will call if questions or symptoms.          Does the patient meet one of the following criteria for ADS visit consideration? No   Reason for Disposition   Patient wants to be seen    Additional Information   Negative: Widespread rash and bright red, sunburn-like and too weak to stand   Negative: Sounds like a life-threatening emergency to the triager   Negative: Painful lump or swelling at opening to anus (rectum)   Negative: Painful lump or swelling at opening to vagina (on labia)   Negative: Painful lump or swelling on scrotum   Negative: Doesn't match the SYMPTOMS of a boil   Negative: Widespread red rash   Negative: Black (necrotic), dark purple, or blisters develop in area of boil   Negative: Patient sounds very sick or weak to the triager   Negative: SEVERE pain (e.g., excruciating)   Negative: Red streak from area of infection   Negative: Fever > 100.4 F (38.0 C)   Negative: Boil > 2 inches across (> 5 cm; larger than a golf ball or ping pong ball)   Negative: Boil > 1/2 inch across (> 12 mm; larger than a marble) and center is soft or pus colored   Negative: Spreading redness around the boil and no fever   Negative: Boil and diabetes mellitus or weak immune system (e.g., HIV positive, cancer chemo, splenectomy, organ transplant, chronic steroids)   Negative:  Boil > 1/4 inch across (> 6 mm; larger than a pencil eraser) and on face   Negative: Boil overlying a joint   Negative: 2 or more boils    Protocols used: Boil (Skin Abscess)-A-OH

## 2025-01-04 ENCOUNTER — HEALTH MAINTENANCE LETTER (OUTPATIENT)
Age: 40
End: 2025-01-04

## 2025-01-25 ENCOUNTER — NURSE TRIAGE (OUTPATIENT)
Dept: NURSING | Facility: CLINIC | Age: 40
End: 2025-01-25
Payer: COMMERCIAL

## 2025-01-25 DIAGNOSIS — J06.9 VIRAL URI WITH COUGH: ICD-10-CM

## 2025-01-25 NOTE — TELEPHONE ENCOUNTER
"Yesterday seen in office. RX cough medication prescribed. [facility] was out of stock and told me they could not help me. She was told by the pharmacist [facility] from [facility].\"[facility].\"   RX: Guiaifesin -codeine (Robitussin AC) 100-10mg/5ml 5ml-10ml every 4 hrs prn cough.     Triaged to a disposition of Call PCP now.   Provider consult indicated.     Reason for page: medication transfer request    Page sent to Dr. Diaz by Answering Service at 3:05pm. Voice mail rec'd--LMTCB.  .   3:18pm Dr Diaz returned call. OK given to transfer RX as requested by patient and pharmacist.  3:24pm [facility] pharmacist states old RX needs to be cancelled, and a new one written. It is a controlled substance.   3:49pm Informed patient that we are unable to obtain controlled substance prescriptions after clinic hours, [facility] unable [facility] them either. Advised to call clinic when it reopens on Monday am. Message will be forwarded to clinic.     Kati Ceballos RN Triage Nurse Advisor 3:51 PM 1/25/2025  Reason for Disposition   [1] Prescription not at pharmacy AND [2] was prescribed by PCP recently  (Exception: Triager has access to EMR and prescription is recorded there. Go to Home Care and confirm for pharmacy.)   Caller requesting a CONTROLLED substance prescription refill (e.g., narcotics, ADHD medicines)    Additional Information   Negative: New-onset or worsening symptoms, see that guideline (e.g., diarrhea, runny nose, sore throat)   Negative: Medicine question not related to refill or renewal   Negative: Caller (e.g., patient or pharmacist) requesting information about a new medicine   Negative: Caller requesting information unrelated to medicine   Negative: [1] Prescription refill request for ESSENTIAL medicine (i.e., likelihood of harm to patient if not taken) AND [2] triager unable to " refill per department policy    Protocols used: Medication Refill and Renewal Call-A-AH

## 2025-01-27 RX ORDER — CODEINE PHOSPHATE AND GUAIFENESIN 10; 100 MG/5ML; MG/5ML
1-2 SOLUTION ORAL EVERY 4 HOURS PRN
Qty: 118 ML | Refills: 0 | Status: SHIPPED | OUTPATIENT
Start: 2025-01-27

## 2025-01-27 NOTE — TELEPHONE ENCOUNTER
Outgoing call to patient, relayed message. Patient has already received a text from pharmacy stating they are working on it and will let her know when it is ready.

## 2025-08-02 ENCOUNTER — HEALTH MAINTENANCE LETTER (OUTPATIENT)
Age: 40
End: 2025-08-02

## 2025-08-24 ENCOUNTER — NURSE TRIAGE (OUTPATIENT)
Dept: NURSING | Facility: CLINIC | Age: 40
End: 2025-08-24
Payer: COMMERCIAL